# Patient Record
Sex: FEMALE | Race: WHITE | Employment: OTHER | URBAN - METROPOLITAN AREA
[De-identification: names, ages, dates, MRNs, and addresses within clinical notes are randomized per-mention and may not be internally consistent; named-entity substitution may affect disease eponyms.]

---

## 2017-01-01 ENCOUNTER — OFFICE VISIT (OUTPATIENT)
Dept: PHYSICAL THERAPY | Facility: HOSPITAL | Age: 47
End: 2017-01-01
Attending: INTERNAL MEDICINE
Payer: MEDICARE

## 2017-01-01 ENCOUNTER — HOSPITAL ENCOUNTER (OUTPATIENT)
Dept: GENERAL RADIOLOGY | Age: 47
Discharge: HOME | End: 2017-01-01
Attending: INTERNAL MEDICINE
Payer: MEDICARE

## 2017-01-01 ENCOUNTER — APPOINTMENT (OUTPATIENT)
Dept: GENERAL RADIOLOGY | Facility: HOSPITAL | Age: 47
DRG: 390 | End: 2017-01-01
Attending: INTERNAL MEDICINE
Payer: MEDICARE

## 2017-01-01 ENCOUNTER — HOSPITAL ENCOUNTER (INPATIENT)
Facility: HOSPITAL | Age: 47
LOS: 5 days | Discharge: HOME | DRG: 390 | End: 2017-01-01
Attending: EMERGENCY MEDICINE | Admitting: INTERNAL MEDICINE
Payer: MEDICARE

## 2017-01-01 ENCOUNTER — TELEPHONE (OUTPATIENT)
Dept: NEUROLOGY | Facility: CLINIC | Age: 47
End: 2017-01-01

## 2017-01-01 ENCOUNTER — OFFICE VISIT (OUTPATIENT)
Dept: PHYSICAL THERAPY | Facility: HOSPITAL | Age: 47
End: 2017-01-01
Attending: AUDIOLOGIST-HEARING AID FITTER
Payer: MEDICARE

## 2017-01-01 ENCOUNTER — APPOINTMENT (OUTPATIENT)
Dept: MRI IMAGING | Facility: HOSPITAL | Age: 47
DRG: 388 | End: 2017-01-01
Attending: Other
Payer: MEDICARE

## 2017-01-01 ENCOUNTER — TELEPHONE (OUTPATIENT)
Dept: PHYSICAL THERAPY | Facility: HOSPITAL | Age: 47
End: 2017-01-01

## 2017-01-01 ENCOUNTER — OFFICE VISIT (OUTPATIENT)
Dept: NEUROLOGY | Facility: CLINIC | Age: 47
End: 2017-01-01

## 2017-01-01 ENCOUNTER — APPOINTMENT (OUTPATIENT)
Dept: GENERAL RADIOLOGY | Facility: HOSPITAL | Age: 47
DRG: 390 | End: 2017-01-01
Attending: EMERGENCY MEDICINE
Payer: MEDICARE

## 2017-01-01 ENCOUNTER — MOBILE ENCOUNTER (OUTPATIENT)
Dept: INTERNAL MEDICINE CLINIC | Facility: CLINIC | Age: 47
End: 2017-01-01

## 2017-01-01 ENCOUNTER — SNF VISIT (OUTPATIENT)
Dept: INTERNAL MEDICINE CLINIC | Facility: SKILLED NURSING FACILITY | Age: 47
End: 2017-01-01

## 2017-01-01 ENCOUNTER — APPOINTMENT (OUTPATIENT)
Dept: CT IMAGING | Facility: HOSPITAL | Age: 47
DRG: 390 | End: 2017-01-01
Attending: EMERGENCY MEDICINE
Payer: MEDICARE

## 2017-01-01 ENCOUNTER — APPOINTMENT (OUTPATIENT)
Dept: CT IMAGING | Facility: HOSPITAL | Age: 47
DRG: 388 | End: 2017-01-01
Attending: INTERNAL MEDICINE
Payer: MEDICARE

## 2017-01-01 ENCOUNTER — APPOINTMENT (OUTPATIENT)
Dept: CT IMAGING | Facility: HOSPITAL | Age: 47
DRG: 388 | End: 2017-01-01
Attending: EMERGENCY MEDICINE
Payer: MEDICARE

## 2017-01-01 ENCOUNTER — HOSPITAL ENCOUNTER (OUTPATIENT)
Dept: GENERAL RADIOLOGY | Facility: HOSPITAL | Age: 47
Discharge: HOME | End: 2017-01-01
Attending: Other
Payer: MEDICARE

## 2017-01-01 ENCOUNTER — APPOINTMENT (OUTPATIENT)
Dept: GENERAL RADIOLOGY | Facility: HOSPITAL | Age: 47
DRG: 388 | End: 2017-01-01
Attending: EMERGENCY MEDICINE
Payer: MEDICARE

## 2017-01-01 ENCOUNTER — HOSPITAL ENCOUNTER (OUTPATIENT)
Dept: MRI IMAGING | Age: 47
Discharge: HOME | End: 2017-01-01
Attending: Other
Payer: MEDICARE

## 2017-01-01 ENCOUNTER — APPOINTMENT (OUTPATIENT)
Dept: CV DIAGNOSTICS | Facility: HOSPITAL | Age: 47
DRG: 388 | End: 2017-01-01
Attending: INTERNAL MEDICINE
Payer: MEDICARE

## 2017-01-01 ENCOUNTER — APPOINTMENT (OUTPATIENT)
Dept: LAB | Facility: HOSPITAL | Age: 47
End: 2017-01-01
Attending: Other
Payer: MEDICARE

## 2017-01-01 ENCOUNTER — HOSPITAL ENCOUNTER (INPATIENT)
Facility: HOSPITAL | Age: 47
LOS: 5 days | Discharge: HOME HEALTH | DRG: 388 | End: 2017-01-01
Attending: EMERGENCY MEDICINE | Admitting: INTERNAL MEDICINE
Payer: MEDICARE

## 2017-01-01 ENCOUNTER — HOSPITAL ENCOUNTER (OUTPATIENT)
Dept: BONE DENSITY | Age: 47
Discharge: HOME | End: 2017-01-01
Attending: INTERNAL MEDICINE
Payer: MEDICARE

## 2017-01-01 VITALS
RESPIRATION RATE: 18 BRPM | HEART RATE: 100 BPM | WEIGHT: 100.19 LBS | BODY MASS INDEX: 17 KG/M2 | SYSTOLIC BLOOD PRESSURE: 115 MMHG | DIASTOLIC BLOOD PRESSURE: 77 MMHG | TEMPERATURE: 98 F

## 2017-01-01 VITALS
TEMPERATURE: 98 F | BODY MASS INDEX: 17 KG/M2 | SYSTOLIC BLOOD PRESSURE: 124 MMHG | OXYGEN SATURATION: 98 % | DIASTOLIC BLOOD PRESSURE: 95 MMHG | WEIGHT: 100.38 LBS | RESPIRATION RATE: 18 BRPM | HEART RATE: 105 BPM

## 2017-01-01 VITALS
BODY MASS INDEX: 17 KG/M2 | WEIGHT: 100.19 LBS | TEMPERATURE: 97 F | SYSTOLIC BLOOD PRESSURE: 110 MMHG | RESPIRATION RATE: 20 BRPM | DIASTOLIC BLOOD PRESSURE: 74 MMHG | HEART RATE: 84 BPM

## 2017-01-01 VITALS
OXYGEN SATURATION: 94 % | HEIGHT: 64 IN | WEIGHT: 108 LBS | RESPIRATION RATE: 18 BRPM | DIASTOLIC BLOOD PRESSURE: 89 MMHG | BODY MASS INDEX: 18.44 KG/M2 | HEART RATE: 114 BPM | SYSTOLIC BLOOD PRESSURE: 135 MMHG | TEMPERATURE: 98 F

## 2017-01-01 VITALS
SYSTOLIC BLOOD PRESSURE: 118 MMHG | OXYGEN SATURATION: 99 % | BODY MASS INDEX: 17 KG/M2 | TEMPERATURE: 98 F | DIASTOLIC BLOOD PRESSURE: 83 MMHG | HEART RATE: 118 BPM | WEIGHT: 100.38 LBS | RESPIRATION RATE: 18 BRPM

## 2017-01-01 VITALS
HEART RATE: 100 BPM | WEIGHT: 100 LBS | SYSTOLIC BLOOD PRESSURE: 90 MMHG | HEIGHT: 64 IN | OXYGEN SATURATION: 92 % | DIASTOLIC BLOOD PRESSURE: 60 MMHG | BODY MASS INDEX: 17.07 KG/M2

## 2017-01-01 VITALS
BODY MASS INDEX: 17 KG/M2 | SYSTOLIC BLOOD PRESSURE: 121 MMHG | HEART RATE: 87 BPM | WEIGHT: 100.19 LBS | TEMPERATURE: 97 F | DIASTOLIC BLOOD PRESSURE: 74 MMHG | RESPIRATION RATE: 20 BRPM

## 2017-01-01 VITALS
WEIGHT: 100.38 LBS | RESPIRATION RATE: 18 BRPM | HEART RATE: 109 BPM | OXYGEN SATURATION: 98 % | SYSTOLIC BLOOD PRESSURE: 130 MMHG | BODY MASS INDEX: 17 KG/M2 | DIASTOLIC BLOOD PRESSURE: 91 MMHG | TEMPERATURE: 97 F

## 2017-01-01 VITALS
HEIGHT: 64 IN | OXYGEN SATURATION: 96 % | HEART RATE: 109 BPM | WEIGHT: 107.5 LBS | RESPIRATION RATE: 20 BRPM | BODY MASS INDEX: 18.35 KG/M2 | SYSTOLIC BLOOD PRESSURE: 116 MMHG | DIASTOLIC BLOOD PRESSURE: 79 MMHG | TEMPERATURE: 98 F

## 2017-01-01 VITALS
RESPIRATION RATE: 16 BRPM | SYSTOLIC BLOOD PRESSURE: 103 MMHG | DIASTOLIC BLOOD PRESSURE: 77 MMHG | HEART RATE: 111 BPM | OXYGEN SATURATION: 100 %

## 2017-01-01 DIAGNOSIS — R11.2 INTRACTABLE VOMITING WITH NAUSEA, UNSPECIFIED VOMITING TYPE: ICD-10-CM

## 2017-01-01 DIAGNOSIS — R27.0 ATAXIA: ICD-10-CM

## 2017-01-01 DIAGNOSIS — R10.84 GENERALIZED ABDOMINAL PAIN: ICD-10-CM

## 2017-01-01 DIAGNOSIS — R29.898 LEG WEAKNESS, BILATERAL: Primary | ICD-10-CM

## 2017-01-01 DIAGNOSIS — G43.709 CHRONIC MIGRAINE WITHOUT AURA WITHOUT STATUS MIGRAINOSUS, NOT INTRACTABLE: ICD-10-CM

## 2017-01-01 DIAGNOSIS — F41.9 ANXIETY: ICD-10-CM

## 2017-01-01 DIAGNOSIS — E87.6 HYPOKALEMIA: ICD-10-CM

## 2017-01-01 DIAGNOSIS — R53.1 GENERALIZED WEAKNESS: ICD-10-CM

## 2017-01-01 DIAGNOSIS — M25.552 HIP PAIN, ACUTE, LEFT: ICD-10-CM

## 2017-01-01 DIAGNOSIS — K56.7 ILEUS (HCC): Primary | ICD-10-CM

## 2017-01-01 DIAGNOSIS — R26.81 UNSTEADY GAIT: ICD-10-CM

## 2017-01-01 DIAGNOSIS — M54.50 ACUTE LEFT-SIDED LOW BACK PAIN WITHOUT SCIATICA: Primary | ICD-10-CM

## 2017-01-01 DIAGNOSIS — E53.8 VITAMIN B12 DEFICIENCY: ICD-10-CM

## 2017-01-01 DIAGNOSIS — M25.569 KNEE PAIN, UNSPECIFIED CHRONICITY, UNSPECIFIED LATERALITY: ICD-10-CM

## 2017-01-01 DIAGNOSIS — R10.13 ABDOMINAL PAIN, EPIGASTRIC: Primary | ICD-10-CM

## 2017-01-01 DIAGNOSIS — M54.16 RADICULOPATHY, LUMBAR REGION: ICD-10-CM

## 2017-01-01 DIAGNOSIS — M19.90 ARTHRITIS: ICD-10-CM

## 2017-01-01 DIAGNOSIS — G89.4 CHRONIC PAIN SYNDROME: ICD-10-CM

## 2017-01-01 DIAGNOSIS — S22.41XA CLOSED FRACTURE OF MULTIPLE RIBS OF RIGHT SIDE, INITIAL ENCOUNTER: ICD-10-CM

## 2017-01-01 DIAGNOSIS — R29.6 FREQUENT FALLS: ICD-10-CM

## 2017-01-01 DIAGNOSIS — M54.50 ACUTE LEFT-SIDED LOW BACK PAIN WITHOUT SCIATICA: ICD-10-CM

## 2017-01-01 DIAGNOSIS — G95.9 MYELOPATHY (HCC): ICD-10-CM

## 2017-01-01 DIAGNOSIS — M19.90 OSTEOARTHRITIS, UNSPECIFIED OSTEOARTHRITIS TYPE, UNSPECIFIED SITE: ICD-10-CM

## 2017-01-01 DIAGNOSIS — N95.1 MENOPAUSAL SYNDROME: ICD-10-CM

## 2017-01-01 DIAGNOSIS — G89.4 CHRONIC PAIN SYNDROME: Primary | ICD-10-CM

## 2017-01-01 DIAGNOSIS — S60.052A CONTUSION OF LEFT LITTLE FINGER WITHOUT DAMAGE TO NAIL, INITIAL ENCOUNTER: ICD-10-CM

## 2017-01-01 DIAGNOSIS — G95.9 MYELOPATHY (HCC): Primary | ICD-10-CM

## 2017-01-01 DIAGNOSIS — R27.0 ATAXIA: Primary | ICD-10-CM

## 2017-01-01 DIAGNOSIS — M25.552 PAIN OF LEFT HIP JOINT: ICD-10-CM

## 2017-01-01 LAB
ALBUMIN SERPL BCP-MCNC: 2.7 G/DL (ref 3.5–4.8)
ALBUMIN SERPL BCP-MCNC: 3.6 G/DL (ref 3.5–4.8)
ALBUMIN SERPL ELPH-MCNC: 3 G/DL (ref 3.8–5.8)
ALBUMIN/GLOB SERPL: 1 {RATIO} (ref 1–2)
ALBUMIN/GLOB SERPL: 1.3 {RATIO} (ref 1–2)
ALP SERPL-CCNC: 79 U/L (ref 32–100)
ALP SERPL-CCNC: 96 U/L (ref 32–100)
ALPHA1 GLOB SERPL ELPH-MCNC: 0.24 G/DL (ref 0.1–0.3)
ALPHA2 GLOB SERPL ELPH-MCNC: 0.64 G/DL (ref 0.6–1)
ALT SERPL-CCNC: 16 U/L (ref 14–54)
ALT SERPL-CCNC: 20 U/L (ref 14–54)
ANION GAP SERPL CALC-SCNC: 10 MMOL/L (ref 0–18)
ANION GAP SERPL CALC-SCNC: 13 MMOL/L (ref 0–18)
ANTIBODY SCREEN: POSITIVE
AST SERPL-CCNC: 21 U/L (ref 15–41)
AST SERPL-CCNC: 30 U/L (ref 15–41)
B-GLOBULIN SERPL ELPH-MCNC: 0.7 G/DL (ref 0.7–1.3)
BASOPHILS # BLD: 0.1 K/UL (ref 0–0.2)
BASOPHILS NFR BLD: 1 %
BILIRUB DIRECT SERPL-MCNC: 0.3 MG/DL (ref 0–0.2)
BILIRUB SERPL-MCNC: 0.6 MG/DL (ref 0.3–1.2)
BILIRUB SERPL-MCNC: 0.6 MG/DL (ref 0.3–1.2)
BILIRUB UR QL: NEGATIVE
BUN SERPL-MCNC: 20 MG/DL (ref 8–20)
BUN SERPL-MCNC: 6 MG/DL (ref 8–20)
BUN/CREAT SERPL: 26 (ref 10–20)
BUN/CREAT SERPL: 8.2 (ref 10–20)
CALCIUM SERPL-MCNC: 8.4 MG/DL (ref 8.5–10.5)
CALCIUM SERPL-MCNC: 9.5 MG/DL (ref 8.5–10.5)
CHLORIDE SERPL-SCNC: 102 MMOL/L (ref 95–110)
CHLORIDE SERPL-SCNC: 104 MMOL/L (ref 95–110)
CLARITY UR: CLEAR
CO2 SERPL-SCNC: 23 MMOL/L (ref 22–32)
CO2 SERPL-SCNC: 24 MMOL/L (ref 22–32)
COLOR UR: YELLOW
CREAT SERPL-MCNC: 0.73 MG/DL (ref 0.5–1.5)
CREAT SERPL-MCNC: 0.77 MG/DL (ref 0.5–1.5)
DIRECT COOMBS POLY: NEGATIVE
EOSINOPHIL # BLD: 0 K/UL (ref 0–0.7)
EOSINOPHIL NFR BLD: 0 %
ERYTHROCYTE [DISTWIDTH] IN BLOOD BY AUTOMATED COUNT: 14.1 % (ref 11–15)
ERYTHROCYTE [DISTWIDTH] IN BLOOD BY AUTOMATED COUNT: 14.2 % (ref 11–15)
FERRITIN SERPL IA-MCNC: 104 NG/ML (ref 11–307)
GAMMA GLOB SERPL ELPH-MCNC: 0.73 G/DL (ref 0.5–1.7)
GLOBULIN PLAS-MCNC: 2.8 G/DL (ref 2.5–3.7)
GLUCOSE SERPL-MCNC: 117 MG/DL (ref 70–99)
GLUCOSE SERPL-MCNC: 150 MG/DL (ref 70–99)
GLUCOSE UR-MCNC: NEGATIVE MG/DL
HCT VFR BLD AUTO: 36.4 % (ref 35–48)
HCT VFR BLD AUTO: 41 % (ref 35–48)
HGB BLD-MCNC: 11.8 G/DL (ref 12–16)
HGB BLD-MCNC: 13.5 G/DL (ref 12–16)
HGB UR QL STRIP.AUTO: NEGATIVE
KETONES UR-MCNC: NEGATIVE MG/DL
LEUKOCYTE ESTERASE UR QL STRIP.AUTO: NEGATIVE
LIPASE SERPL-CCNC: 18 U/L (ref 22–51)
LYMPHOCYTES # BLD: 1.3 K/UL (ref 1–4)
LYMPHOCYTES NFR BLD: 12 %
MCH RBC QN AUTO: 31.7 PG (ref 27–32)
MCH RBC QN AUTO: 32.1 PG (ref 27–32)
MCHC RBC AUTO-ENTMCNC: 32.4 G/DL (ref 32–37)
MCHC RBC AUTO-ENTMCNC: 32.9 G/DL (ref 32–37)
MCV RBC AUTO: 97.6 FL (ref 80–100)
MCV RBC AUTO: 97.6 FL (ref 80–100)
MONOCYTES # BLD: 0.2 K/UL (ref 0–1)
MONOCYTES NFR BLD: 2 %
NEUTROPHILS # BLD AUTO: 9.1 K/UL (ref 1.8–7.7)
NEUTROPHILS NFR BLD: 85 %
NITRITE UR QL STRIP.AUTO: NEGATIVE
OSMOLALITY UR CALC.SUM OF ELEC: 285 MOSM/KG (ref 275–295)
OSMOLALITY UR CALC.SUM OF ELEC: 291 MOSM/KG (ref 275–295)
PH UR: 5 [PH] (ref 5–8)
PLATELET # BLD AUTO: 452 K/UL (ref 140–400)
PLATELET # BLD AUTO: 550 K/UL (ref 140–400)
PMV BLD AUTO: 7.7 FL (ref 7.4–10.3)
PMV BLD AUTO: 8.1 FL (ref 7.4–10.3)
POTASSIUM SERPL-SCNC: 3.4 MMOL/L (ref 3.3–5.1)
POTASSIUM SERPL-SCNC: 3.5 MMOL/L (ref 3.3–5.1)
POTASSIUM SERPL-SCNC: 3.5 MMOL/L (ref 3.3–5.1)
POTASSIUM SERPL-SCNC: 4 MMOL/L (ref 3.3–5.1)
PROT SERPL-MCNC: 5.5 G/DL (ref 5.9–8.4)
PROT SERPL-MCNC: 7 G/DL (ref 5.9–8.4)
PROT UR-MCNC: NEGATIVE MG/DL
RBC # BLD AUTO: 3.73 M/UL (ref 3.7–5.4)
RBC # BLD AUTO: 4.2 M/UL (ref 3.7–5.4)
RBC #/AREA URNS AUTO: 1 /HPF
RH BLOOD TYPE: POSITIVE
SODIUM SERPL-SCNC: 138 MMOL/L (ref 136–144)
SODIUM SERPL-SCNC: 138 MMOL/L (ref 136–144)
TOTAL PROTEIN (SPECIAL TESTING): 5.3 G/DL (ref 6.5–9.1)
UROBILINOGEN UR STRIP-ACNC: 2
VIT C UR-MCNC: 40 MG/DL
WBC # BLD AUTO: 10.7 K/UL (ref 4–11)
WBC # BLD AUTO: 13.2 K/UL (ref 4–11)
WBC #/AREA URNS AUTO: 1 /HPF

## 2017-01-01 PROCEDURE — 97162 PT EVAL MOD COMPLEX 30 MIN: CPT

## 2017-01-01 PROCEDURE — 71010 XR CHEST AP PORTABLE  (CPT=71010): CPT

## 2017-01-01 PROCEDURE — 97112 NEUROMUSCULAR REEDUCATION: CPT

## 2017-01-01 PROCEDURE — 99232 SBSQ HOSP IP/OBS MODERATE 35: CPT | Performed by: INTERNAL MEDICINE

## 2017-01-01 PROCEDURE — 93306 TTE W/DOPPLER COMPLETE: CPT | Performed by: INTERNAL MEDICINE

## 2017-01-01 PROCEDURE — 0DH67UZ INSERTION OF FEEDING DEVICE INTO STOMACH, VIA NATURAL OR ARTIFICIAL OPENING: ICD-10-PCS | Performed by: EMERGENCY MEDICINE

## 2017-01-01 PROCEDURE — 99223 1ST HOSP IP/OBS HIGH 75: CPT | Performed by: OTHER

## 2017-01-01 PROCEDURE — 99308 SBSQ NF CARE LOW MDM 20: CPT | Performed by: NURSE PRACTITIONER

## 2017-01-01 PROCEDURE — 87070 CULTURE OTHR SPECIMN AEROBIC: CPT

## 2017-01-01 PROCEDURE — 95816 EEG AWAKE AND DROWSY: CPT | Performed by: OTHER

## 2017-01-01 PROCEDURE — 71101 X-RAY EXAM UNILAT RIBS/CHEST: CPT | Performed by: EMERGENCY MEDICINE

## 2017-01-01 PROCEDURE — 4A10X4Z MONITORING OF CENTRAL NERVOUS ELECTRICAL ACTIVITY, EXTERNAL APPROACH: ICD-10-PCS | Performed by: OTHER

## 2017-01-01 PROCEDURE — 84165 PROTEIN E-PHORESIS SERUM: CPT

## 2017-01-01 PROCEDURE — A9575 INJ GADOTERATE MEGLUMI 0.1ML: HCPCS | Performed by: OTHER

## 2017-01-01 PROCEDURE — 70551 MRI BRAIN STEM W/O DYE: CPT | Performed by: OTHER

## 2017-01-01 PROCEDURE — 87205 SMEAR GRAM STAIN: CPT

## 2017-01-01 PROCEDURE — 82040 ASSAY OF SERUM ALBUMIN: CPT

## 2017-01-01 PROCEDURE — 82784 ASSAY IGA/IGD/IGG/IGM EACH: CPT

## 2017-01-01 PROCEDURE — 36415 COLL VENOUS BLD VENIPUNCTURE: CPT

## 2017-01-01 PROCEDURE — 84157 ASSAY OF PROTEIN OTHER: CPT

## 2017-01-01 PROCEDURE — 89051 BODY FLUID CELL COUNT: CPT

## 2017-01-01 PROCEDURE — 99307 SBSQ NF CARE SF MDM 10: CPT | Performed by: NURSE PRACTITIONER

## 2017-01-01 PROCEDURE — 74020 XR ABDOMEN, OBSTRUCTIVE SERIES (CPT=74020): CPT | Performed by: RADIOLOGY

## 2017-01-01 PROCEDURE — 97530 THERAPEUTIC ACTIVITIES: CPT

## 2017-01-01 PROCEDURE — 99232 SBSQ HOSP IP/OBS MODERATE 35: CPT | Performed by: OTHER

## 2017-01-01 PROCEDURE — 62270 DX LMBR SPI PNXR: CPT

## 2017-01-01 PROCEDURE — 77003 FLUOROGUIDE FOR SPINE INJECT: CPT

## 2017-01-01 PROCEDURE — 73502 X-RAY EXAM HIP UNI 2-3 VIEWS: CPT

## 2017-01-01 PROCEDURE — 99310 SBSQ NF CARE HIGH MDM 45: CPT | Performed by: NURSE PRACTITIONER

## 2017-01-01 PROCEDURE — 71260 CT THORAX DX C+: CPT | Performed by: INTERNAL MEDICINE

## 2017-01-01 PROCEDURE — 73140 X-RAY EXAM OF FINGER(S): CPT

## 2017-01-01 PROCEDURE — 89050 BODY FLUID CELL COUNT: CPT

## 2017-01-01 PROCEDURE — 83916 OLIGOCLONAL BANDS: CPT

## 2017-01-01 PROCEDURE — 99214 OFFICE O/P EST MOD 30 MIN: CPT | Performed by: OTHER

## 2017-01-01 PROCEDURE — 74020 XR ABDOMEN, OBSTRUCTIVE SERIES (CPT=74020): CPT | Performed by: EMERGENCY MEDICINE

## 2017-01-01 PROCEDURE — 82945 GLUCOSE OTHER FLUID: CPT

## 2017-01-01 PROCEDURE — 82042 OTHER SOURCE ALBUMIN QUAN EA: CPT

## 2017-01-01 PROCEDURE — 72156 MRI NECK SPINE W/O & W/DYE: CPT

## 2017-01-01 PROCEDURE — 86592 SYPHILIS TEST NON-TREP QUAL: CPT

## 2017-01-01 PROCEDURE — 72110 X-RAY EXAM L-2 SPINE 4/>VWS: CPT

## 2017-01-01 PROCEDURE — 97163 PT EVAL HIGH COMPLEX 45 MIN: CPT

## 2017-01-01 PROCEDURE — 74177 CT ABD & PELVIS W/CONTRAST: CPT | Performed by: EMERGENCY MEDICINE

## 2017-01-01 PROCEDURE — 74177 CT ABD & PELVIS W/CONTRAST: CPT

## 2017-01-01 RX ORDER — 0.9 % SODIUM CHLORIDE 0.9 %
5 VIAL (ML) INJECTION AS NEEDED
Status: DISCONTINUED | OUTPATIENT
Start: 2017-01-01 | End: 2017-01-01

## 2017-01-01 RX ORDER — SUMATRIPTAN 50 MG/1
100 TABLET, FILM COATED ORAL ONCE
Status: COMPLETED | OUTPATIENT
Start: 2017-01-01 | End: 2017-01-01

## 2017-01-01 RX ORDER — TOPIRAMATE 25 MG/1
25 TABLET ORAL 2 TIMES DAILY
Status: DISCONTINUED | OUTPATIENT
Start: 2017-01-01 | End: 2017-01-01

## 2017-01-01 RX ORDER — HYDROMORPHONE HYDROCHLORIDE 1 MG/ML
0.5 INJECTION, SOLUTION INTRAMUSCULAR; INTRAVENOUS; SUBCUTANEOUS ONCE
Status: COMPLETED | OUTPATIENT
Start: 2017-01-01 | End: 2017-01-01

## 2017-01-01 RX ORDER — SODIUM CHLORIDE 0.9 % (FLUSH) 0.9 %
SYRINGE (ML) INJECTION
Status: DISPENSED
Start: 2017-01-01 | End: 2017-01-01

## 2017-01-01 RX ORDER — LORAZEPAM 2 MG/ML
0.5 INJECTION INTRAMUSCULAR ONCE
Status: DISCONTINUED | OUTPATIENT
Start: 2017-01-01 | End: 2017-01-01

## 2017-01-01 RX ORDER — SODIUM CHLORIDE 0.9 % (FLUSH) 0.9 %
SYRINGE (ML) INJECTION
Status: COMPLETED
Start: 2017-01-01 | End: 2017-01-01

## 2017-01-01 RX ORDER — PROCHLORPERAZINE MALEATE 10 MG
10 TABLET ORAL EVERY 6 HOURS PRN
Status: DISCONTINUED | OUTPATIENT
Start: 2017-01-01 | End: 2017-01-01

## 2017-01-01 RX ORDER — HYDROMORPHONE HYDROCHLORIDE 1 MG/ML
1.5 INJECTION, SOLUTION INTRAMUSCULAR; INTRAVENOUS; SUBCUTANEOUS EVERY 4 HOURS PRN
Status: DISCONTINUED | OUTPATIENT
Start: 2017-01-01 | End: 2017-01-01

## 2017-01-01 RX ORDER — HYDROXYZINE PAMOATE 50 MG/1
CAPSULE ORAL
Refills: 1 | COMMUNITY
Start: 2017-01-01

## 2017-01-01 RX ORDER — ACETAMINOPHEN AND CODEINE PHOSPHATE 120; 12 MG/5ML; MG/5ML
60 SOLUTION ORAL NIGHTLY PRN
Status: DISCONTINUED | OUTPATIENT
Start: 2017-01-01 | End: 2017-01-01

## 2017-01-01 RX ORDER — BISACODYL 10 MG
10 SUPPOSITORY, RECTAL RECTAL DAILY
Status: DISCONTINUED | OUTPATIENT
Start: 2017-01-01 | End: 2017-01-01

## 2017-01-01 RX ORDER — BISACODYL 10 MG
10 SUPPOSITORY, RECTAL RECTAL
Status: DISCONTINUED | OUTPATIENT
Start: 2017-01-01 | End: 2017-01-01

## 2017-01-01 RX ORDER — 0.9 % SODIUM CHLORIDE 0.9 %
VIAL (ML) INJECTION
Status: COMPLETED
Start: 2017-01-01 | End: 2017-01-01

## 2017-01-01 RX ORDER — DEXTROSE, SODIUM CHLORIDE, AND POTASSIUM CHLORIDE 5; .45; .075 G/100ML; G/100ML; G/100ML
INJECTION INTRAVENOUS CONTINUOUS
Status: DISCONTINUED | OUTPATIENT
Start: 2017-01-01 | End: 2017-01-01

## 2017-01-01 RX ORDER — LORAZEPAM 2 MG/ML
1 INJECTION INTRAMUSCULAR EVERY 6 HOURS PRN
Status: DISCONTINUED | OUTPATIENT
Start: 2017-01-01 | End: 2017-01-01

## 2017-01-01 RX ORDER — ONDANSETRON 2 MG/ML
8 INJECTION INTRAMUSCULAR; INTRAVENOUS EVERY 6 HOURS PRN
Status: DISCONTINUED | OUTPATIENT
Start: 2017-01-01 | End: 2017-01-01

## 2017-01-01 RX ORDER — MORPHINE SULFATE 4 MG/ML
4 INJECTION, SOLUTION INTRAMUSCULAR; INTRAVENOUS ONCE
Status: COMPLETED | OUTPATIENT
Start: 2017-01-01 | End: 2017-01-01

## 2017-01-01 RX ORDER — 0.9 % SODIUM CHLORIDE 0.9 %
VIAL (ML) INJECTION
Status: DISPENSED
Start: 2017-01-01 | End: 2017-01-01

## 2017-01-01 RX ORDER — METOPROLOL SUCCINATE 50 MG/1
50 TABLET, EXTENDED RELEASE ORAL
Status: DISCONTINUED | OUTPATIENT
Start: 2017-01-01 | End: 2017-01-01

## 2017-01-01 RX ORDER — METOCLOPRAMIDE 10 MG/1
10 TABLET ORAL
Status: DISCONTINUED | OUTPATIENT
Start: 2017-01-01 | End: 2017-01-01

## 2017-01-01 RX ORDER — DIPHENHYDRAMINE HYDROCHLORIDE 50 MG/ML
25 INJECTION INTRAMUSCULAR; INTRAVENOUS ONCE
Status: COMPLETED | OUTPATIENT
Start: 2017-01-01 | End: 2017-01-01

## 2017-01-01 RX ORDER — BUSPIRONE HYDROCHLORIDE 15 MG/1
15 TABLET ORAL 3 TIMES DAILY
Status: DISCONTINUED | OUTPATIENT
Start: 2017-01-01 | End: 2017-01-01

## 2017-01-01 RX ORDER — ONDANSETRON 4 MG/1
8 TABLET, FILM COATED ORAL EVERY 8 HOURS PRN
Status: DISCONTINUED | OUTPATIENT
Start: 2017-01-01 | End: 2017-01-01

## 2017-01-01 RX ORDER — ONDANSETRON 2 MG/ML
INJECTION INTRAMUSCULAR; INTRAVENOUS
Status: DISPENSED
Start: 2017-01-01 | End: 2017-01-01

## 2017-01-01 RX ORDER — DIAZEPAM 10 MG/1
10 TABLET ORAL 4 TIMES DAILY
Status: DISCONTINUED | OUTPATIENT
Start: 2017-01-01 | End: 2017-01-01

## 2017-01-01 RX ORDER — CYANOCOBALAMIN 1000 UG/ML
1000 INJECTION INTRAMUSCULAR; SUBCUTANEOUS ONCE
Status: COMPLETED | OUTPATIENT
Start: 2017-01-01 | End: 2017-01-01

## 2017-01-01 RX ORDER — LORAZEPAM 2 MG/1
2 TABLET ORAL 2 TIMES DAILY PRN
Status: DISCONTINUED | OUTPATIENT
Start: 2017-01-01 | End: 2017-01-01

## 2017-01-01 RX ORDER — HYDROCODONE BITARTRATE AND ACETAMINOPHEN 10; 325 MG/1; MG/1
1 TABLET ORAL
Status: DISCONTINUED | OUTPATIENT
Start: 2017-01-01 | End: 2017-01-01

## 2017-01-01 RX ORDER — PANTOPRAZOLE SODIUM 40 MG/1
40 TABLET, DELAYED RELEASE ORAL
Status: DISCONTINUED | OUTPATIENT
Start: 2017-01-01 | End: 2017-01-01

## 2017-01-01 RX ORDER — ALBUTEROL SULFATE 90 UG/1
2 AEROSOL, METERED RESPIRATORY (INHALATION) EVERY 6 HOURS PRN
Status: DISCONTINUED | OUTPATIENT
Start: 2017-01-01 | End: 2017-01-01

## 2017-01-01 RX ORDER — MORPHINE SULFATE 30 MG/1
30 TABLET, FILM COATED, EXTENDED RELEASE ORAL EVERY 8 HOURS SCHEDULED
Status: DISCONTINUED | OUTPATIENT
Start: 2017-01-01 | End: 2017-01-01

## 2017-01-01 RX ORDER — LIDOCAINE 50 MG/G
2 PATCH TOPICAL EVERY 24 HOURS
Qty: 60 PATCH | Refills: 1 | Status: SHIPPED | OUTPATIENT
Start: 2017-01-01

## 2017-01-01 RX ORDER — ONDANSETRON 2 MG/ML
4 INJECTION INTRAMUSCULAR; INTRAVENOUS ONCE
Status: COMPLETED | OUTPATIENT
Start: 2017-01-01 | End: 2017-01-01

## 2017-01-01 RX ORDER — HYDROMORPHONE HYDROCHLORIDE 1 MG/ML
0.5 INJECTION, SOLUTION INTRAMUSCULAR; INTRAVENOUS; SUBCUTANEOUS EVERY 2 HOUR PRN
Status: DISCONTINUED | OUTPATIENT
Start: 2017-01-01 | End: 2017-01-01

## 2017-01-01 RX ORDER — TRAZODONE HYDROCHLORIDE 100 MG/1
200 TABLET ORAL NIGHTLY
Status: DISCONTINUED | OUTPATIENT
Start: 2017-01-01 | End: 2017-01-01

## 2017-01-01 RX ORDER — DIAZEPAM 10 MG/1
10 TABLET ORAL 4 TIMES DAILY PRN
Status: DISCONTINUED | OUTPATIENT
Start: 2017-01-01 | End: 2017-01-01

## 2017-01-01 RX ORDER — LIDOCAINE 50 MG/G
1 PATCH TOPICAL EVERY 24 HOURS
Status: DISCONTINUED | OUTPATIENT
Start: 2017-01-01 | End: 2017-01-01

## 2017-01-01 RX ORDER — GABAPENTIN 100 MG/1
300 CAPSULE ORAL 4 TIMES DAILY
COMMUNITY
Start: 2017-01-01

## 2017-01-01 RX ORDER — MORPHINE SULFATE 30 MG/1
30 TABLET, FILM COATED, EXTENDED RELEASE ORAL EVERY 12 HOURS SCHEDULED
Status: DISCONTINUED | OUTPATIENT
Start: 2017-01-01 | End: 2017-01-01

## 2017-01-01 RX ORDER — ONDANSETRON 2 MG/ML
8 INJECTION INTRAMUSCULAR; INTRAVENOUS EVERY 4 HOURS PRN
Status: DISCONTINUED | OUTPATIENT
Start: 2017-01-01 | End: 2017-01-01

## 2017-01-01 RX ORDER — HYDROMORPHONE HYDROCHLORIDE 1 MG/ML
INJECTION, SOLUTION INTRAMUSCULAR; INTRAVENOUS; SUBCUTANEOUS
Status: COMPLETED
Start: 2017-01-01 | End: 2017-01-01

## 2017-01-01 RX ORDER — DIPHENHYDRAMINE HYDROCHLORIDE 50 MG/ML
50 INJECTION INTRAMUSCULAR; INTRAVENOUS EVERY 6 HOURS PRN
Status: DISCONTINUED | OUTPATIENT
Start: 2017-01-01 | End: 2017-01-01

## 2017-01-01 RX ORDER — SUCRALFATE 1 G/1
1 TABLET ORAL
Status: DISCONTINUED | OUTPATIENT
Start: 2017-01-01 | End: 2017-01-01

## 2017-01-01 RX ORDER — POTASSIUM CHLORIDE 20 MEQ/1
40 TABLET, EXTENDED RELEASE ORAL EVERY 4 HOURS
Status: COMPLETED | OUTPATIENT
Start: 2017-01-01 | End: 2017-01-01

## 2017-01-01 RX ORDER — HYDROXYZINE HYDROCHLORIDE 25 MG/1
50 TABLET, FILM COATED ORAL EVERY 6 HOURS PRN
Status: DISCONTINUED | OUTPATIENT
Start: 2017-01-01 | End: 2017-01-01

## 2017-01-01 RX ORDER — HYDROMORPHONE HYDROCHLORIDE 1 MG/ML
1 INJECTION, SOLUTION INTRAMUSCULAR; INTRAVENOUS; SUBCUTANEOUS ONCE
Status: COMPLETED | OUTPATIENT
Start: 2017-01-01 | End: 2017-01-01

## 2017-01-01 RX ORDER — HYDROCODONE BITARTRATE AND ACETAMINOPHEN 10; 325 MG/1; MG/1
2 TABLET ORAL EVERY 6 HOURS PRN
Status: DISCONTINUED | OUTPATIENT
Start: 2017-01-01 | End: 2017-01-01

## 2017-01-01 RX ORDER — LORAZEPAM 2 MG/ML
1 INJECTION INTRAMUSCULAR ONCE
Status: COMPLETED | OUTPATIENT
Start: 2017-01-01 | End: 2017-01-01

## 2017-01-01 RX ORDER — POTASSIUM CHLORIDE 14.9 MG/ML
20 INJECTION INTRAVENOUS ONCE
Status: COMPLETED | OUTPATIENT
Start: 2017-01-01 | End: 2017-01-01

## 2017-01-01 RX ORDER — METOCLOPRAMIDE HYDROCHLORIDE 5 MG/ML
10 INJECTION INTRAMUSCULAR; INTRAVENOUS EVERY 6 HOURS PRN
Status: DISCONTINUED | OUTPATIENT
Start: 2017-01-01 | End: 2017-01-01

## 2017-01-01 RX ORDER — SUMATRIPTAN 50 MG/1
100 TABLET, FILM COATED ORAL EVERY 2 HOUR PRN
Status: DISCONTINUED | OUTPATIENT
Start: 2017-01-01 | End: 2017-01-01

## 2017-01-01 RX ORDER — TRAZODONE HYDROCHLORIDE 100 MG/1
100 TABLET ORAL 2 TIMES DAILY
Status: DISCONTINUED | OUTPATIENT
Start: 2017-01-01 | End: 2017-01-01

## 2017-01-01 RX ORDER — DULOXETIN HYDROCHLORIDE 30 MG/1
60 CAPSULE, DELAYED RELEASE ORAL DAILY
Status: DISCONTINUED | OUTPATIENT
Start: 2017-01-01 | End: 2017-01-01

## 2017-01-01 RX ORDER — METOCLOPRAMIDE HYDROCHLORIDE 5 MG/ML
10 INJECTION INTRAMUSCULAR; INTRAVENOUS ONCE
Status: COMPLETED | OUTPATIENT
Start: 2017-01-01 | End: 2017-01-01

## 2017-01-01 RX ORDER — SUMATRIPTAN 6 MG/.5ML
6 INJECTION, SOLUTION SUBCUTANEOUS ONCE
Status: COMPLETED | OUTPATIENT
Start: 2017-01-01 | End: 2017-01-01

## 2017-01-01 RX ORDER — TOPIRAMATE 25 MG/1
25 TABLET ORAL 2 TIMES DAILY
Qty: 60 TABLET | Refills: 1 | Status: SHIPPED | OUTPATIENT
Start: 2017-01-01

## 2017-01-01 RX ORDER — OLANZAPINE 2.5 MG/1
2.5 TABLET ORAL NIGHTLY
Status: DISCONTINUED | OUTPATIENT
Start: 2017-01-01 | End: 2017-01-01

## 2017-01-01 RX ORDER — BUSPIRONE HYDROCHLORIDE 5 MG/1
15 TABLET ORAL 3 TIMES DAILY
Status: DISCONTINUED | OUTPATIENT
Start: 2017-01-01 | End: 2017-01-01

## 2017-01-01 RX ORDER — POTASSIUM CHLORIDE 20 MEQ/1
40 TABLET, EXTENDED RELEASE ORAL ONCE
Status: COMPLETED | OUTPATIENT
Start: 2017-01-01 | End: 2017-01-01

## 2017-01-01 RX ORDER — CYCLOBENZAPRINE HCL 10 MG
10 TABLET ORAL 3 TIMES DAILY PRN
Status: DISCONTINUED | OUTPATIENT
Start: 2017-01-01 | End: 2017-01-01

## 2017-01-01 RX ORDER — HYDROMORPHONE HYDROCHLORIDE 1 MG/ML
1 INJECTION, SOLUTION INTRAMUSCULAR; INTRAVENOUS; SUBCUTANEOUS EVERY 2 HOUR PRN
Status: DISCONTINUED | OUTPATIENT
Start: 2017-01-01 | End: 2017-01-01

## 2017-01-01 RX ORDER — SODIUM CHLORIDE 450 MG/100ML
INJECTION, SOLUTION INTRAVENOUS CONTINUOUS
Status: DISCONTINUED | OUTPATIENT
Start: 2017-01-01 | End: 2017-01-01

## 2017-01-01 RX ORDER — ENOXAPARIN SODIUM 100 MG/ML
40 INJECTION SUBCUTANEOUS EVERY 24 HOURS
Status: DISCONTINUED | OUTPATIENT
Start: 2017-01-01 | End: 2017-01-01

## 2017-01-01 RX ORDER — RIZATRIPTAN BENZOATE 10 MG/1
10 TABLET, ORALLY DISINTEGRATING ORAL ONCE
Status: COMPLETED | OUTPATIENT
Start: 2017-01-01 | End: 2017-01-01

## 2017-01-01 RX ORDER — TROLAMINE SALICYLATE 10 G/100G
CREAM TOPICAL 2 TIMES DAILY PRN
Status: DISCONTINUED | OUTPATIENT
Start: 2017-01-01 | End: 2017-01-01

## 2017-01-01 RX ORDER — HYDROXYZINE PAMOATE 50 MG/1
50 CAPSULE ORAL 4 TIMES DAILY PRN
Status: DISCONTINUED | OUTPATIENT
Start: 2017-01-01 | End: 2017-01-01

## 2017-02-02 PROBLEM — R10.13 ABDOMINAL PAIN, EPIGASTRIC: Status: ACTIVE | Noted: 2017-01-01

## 2017-02-02 NOTE — ED INITIAL ASSESSMENT (HPI)
Pt came in for abdominal pain for 4 days. Pt concerned for bowel obstruction. Last BM unknown. RR even and nonlabored, speaking in full sentences.  Denies fevers, cough, N/V.

## 2017-02-02 NOTE — ED NOTES
Pt presents to ED with c/o N/V and mid abd pain. Pt states s/s onset \"few days ago\" but pt thought \"maybe I had a cold\". Pt has a hx of bowel obstruction x3. States current s/s are similar.

## 2017-02-02 NOTE — CONSULTS
[facility]    Report of Consultation    Ricky Cain Patient Status:  Emergency    10/21/1970 MRN [id number]   Location [address] Attending No att. providers found   Hosp Day # 0 PCP Amy Shoemaker MD     D takes Zofran, episodes of small bowel obstruction, and a chronic pain syndrome including migraines, abdominal pain, and arthritic pains in her lower extremities for which she has been seeing a pain specialist who was prescribed Norco and morphine.     She c Family History  Family History   Problem Relation Age of Onset   • Hypertension Father    • Hypertension Mother    • Heart Disorder Mother    • Migraines Mother    • Hypertension Brother    • Heart Disorder Brother    • Migraines Brother        Román Donald tenderness  PSYCH: normal affect  NUT: well nourished appearing, no cachexia      Results:     Laboratory Data:    Lab Results  Component Value Date   WBC 10.7 02/02/2017   HGB 13.5 02/02/2017   HCT 41.0 02/02/2017   * 02/02/2017   CREATSERUM 0.77 0 (CPT=73140)  COMPARISON: None. INDICATIONS: Left 5th digit pain from DIPJ down into hand post fall 1.5 weeks ago. TECHNIQUE: 3 views were obtained.    FINDINGS:  BONES: There a subacute appearing oblique/spiral fracture through the middle phalanx of the r centered at C5-6. Multilevel spondylosis. CORD: Mild ventral cord effacement C5-6 without abnormal cord signal or enhancement. OTHER: No abnormal enhancement.   CERVICAL DISC LEVELS: C3-C4: Broad left posterior lateral disc osteophyte complex with ventral e Sharan Torres MD [facility]    2/2/2017

## 2017-02-02 NOTE — ED PROVIDER NOTES
Patient Seen in: [facility] AND [facility] Emergency Department    History   Patient presents with:  Abdomen/Flank Pain (GI/)    Stated Complaint: body aches x 4 days    HPI    48year old female with a history of peptic ulcer disease, bleeding ulcers, status po tablet (2 mg total) by mouth 2 (two) times daily as needed for Anxiety. morphINE Sulfate ER 30 MG Oral Tab CR,  Take 1 tablet (30 mg total) by mouth every 8 (eight) hours.    Flurazepam HCl 30 MG Oral Cap,  Take 2 capsules (60 mg total) by mouth nightly a MG Oral Tab,  Take  by mouth.        Family History   Problem Relation Age of Onset   • Hypertension Father    • Hypertension Mother    • Heart Disorder Mother    • Migraines Mother    • Hypertension Brother    • Heart Disorder Brother    • Migraines Brothe apnea and no tachypnea. No respiratory distress. She exhibits no retraction. Abdominal: There is tenderness (In the epigastrium). There is no rebound and no guarding. Musculoskeletal: Normal range of motion. She exhibits no edema.    Neurological: She i limits            Imaging Results Available and Reviewed while in ED: XR ABDOMEN, OBSTRUCTIVE SERIES (CPT=74020)    (Results Pending)  CT ABDOMEN+PELVIS(CONTRAST ONLY)(CPT=74177)    (Results Pending)    ED Medications Administered:   Medications   sodium c pain syndrome; Abdominal pain; Knee derangement; Anemia; Asthma; Depression; Radiculopathy, lumbar region; Radiculopathy, cervical; Sciatica; CELESTINO (obstructive sleep apnea);  Fatigue; and Abdominal pain, epigastric on her problem list. to contribute to the c

## 2017-02-03 NOTE — DIETARY NOTE
ADULT NUTRITION INITIAL ASSESSMENT    Pt is at moderate nutrition risk. Pt meets malnutrition criteria.       CRITERIA FOR MALNUTRITION DIAGNOSIS:  Criteria for non-severe malnutrition diagnosis: chronic illness related to body fat mild depletion and relat 02/02/17 1252 46.267 kg (102 lb)       GASTROINTESTINAL PROBLEMS: abdominal pain, nausea and constipation    FOOD/NUTRITION RELATED HISTORY:  Appetite: NPO  Intake:NPO    Intake Meeting Needs: NPO    Food Allergies: No    MEDICATIONS: Protonix; others no

## 2017-02-03 NOTE — PROGRESS NOTES
[facility]  Gastroenterology Progress Note    Gurmeet Salgado Patient Status:  Inpatient    10/21/1970 MRN [id number]   Location [facility] 5SW/SE Attending Zelda [place] MD   Hosp Day # 1 PCP Anastasiya Monroe MD     Subjective:  Debbie Gilmore calculi. 5. Lesser incidental findings as above.         Xr Chest Ap Portable  (cpt=71010)    2/3/2017  CONCLUSION:  Nasogastric tube has been placed with slight advancement of the tube extending below the diaphragm in the region of the gastroesophageal ju

## 2017-02-03 NOTE — H&P
[facility]    History and Physical    Vibha Meyers Patient Status:  Inpatient    10/21/1970 MRN [id number]   Location [facility] 5SW/SE Attending Madaline Seip, MD   Hosp Day # 1 PCP Elias Starr MD     Date:  2/3/2017  Date Allergies    Prescriptions prior to admission:  diazepam (VALIUM) 10 MG Oral Tab Take 1 tablet (10 mg total) by mouth 4 (four) times daily.    Cyclobenzaprine HCl 10 MG Oral Tab TAKE TWO TABLETS BY MOUTH THREE TIMES DAILY AS NEEDED FOR MUSCLE SPASM   HYDROc total) rectally daily.    sucralfate (CARAFATE) 1 G Oral Tab TAKE 1 TABLET BY MOUTH EVERY 6 HOURS (Patient taking differently: as needed)   fluticasone-salmeterol (ADVAIR DISKUS) 250-50 MCG/DOSE Inhalation Aerosol Powder, Breath Activated Inhale 1 puff into 02/02/2017   ALB 3.6 02/02/2017   ALKPHO 96 02/02/2017   BILT 0.6 02/02/2017   TP 7.0 02/02/2017   AST 30 02/02/2017   ALT 20 02/02/2017   TSH 1.03 12/10/2015   LIP 18* 02/02/2017     Xr Abdomen, Obstructive Series (cpt=74020)    2/2/2017  CONCLUSION:  1.

## 2017-02-03 NOTE — CM/SW NOTE
refeffal made to Replaced by [facility] for information to be given to patient about DORS

## 2017-02-03 NOTE — CM/SW NOTE
Met with patient at bedside to explain the BPCI/Medicare program. Patient agreed with phone follow up for 3 months from [address] [place] [address] after discharge from [facility]. Patient was enrolled under DRG   390   . BPCI/Medicare letter and brochure provided.  Patient i

## 2017-02-03 NOTE — CONSULTS
[facility]    PATIENT'S NAME: Jayla Borjaz   ATTENDING PHYSICIAN: Yuly Child MD   CONSULTING PHYSICIAN: Radha Richardson MD   PATIENT ACCOUNT#:   [id number]    LOCATION:  [facility] [place] [address] #:   [id number]       DATE OF BIRTH: eventually she returned to [facility] approximately 3 or 4 years ago, at which time she had a bowel obstruction. I was consultant on that occasion and we treated her conservatively. Her episodes subsided.   In 2015, though, she had a simil lysis of adhesions and bowel resection, the last one being in 2015. She also had surgery of small bowel resection for a bleeding AV malformation? SOCIAL HISTORY:  The patient does not drink, does not use recreational drugs, and lives with family. NECK:  Supple. There is no lymphadenopathy. The carotid pulses are palpable bilaterally. The thyroid is not palpable. LUNGS:  Clear to auscultation. HEART:  Heart tones are within normal limits. Normal sinus rhythm. ABDOMEN:  Soft.   There are some 18:15:21  [facility] [id number]  [facility]/

## 2017-02-04 PROBLEM — R11.2 NAUSEA & VOMITING: Status: ACTIVE | Noted: 2017-01-01

## 2017-02-04 PROBLEM — K56.7 ILEUS (HCC): Status: ACTIVE | Noted: 2017-01-01

## 2017-02-04 NOTE — PROGRESS NOTES
[facility]    Progress Note    Sarthakrenetta Wynne Patient Status:  Inpatient    10/21/1970 MRN [id number]   Location [facility] 5SW/SE Attending Christopher Feldman MD   Hosp Day # 1 PCP Heather Mann MD       Subjective:   Shaq aguirre Only)(cpt=74177)    2/2/2017  CONCLUSION:  1. Dilated loops of bowel are present the proximally and in the right lower quadrant. Distal small bowel loops are collapsed. There is gradual tapering without distinct transition point.  Findings are suspicious fo

## 2017-02-04 NOTE — PROGRESS NOTES
[facility]    Progress Note    Buck Pyle Patient Status:  Inpatient    10/21/1970 MRN [id number]   Location [facility] 5SW/SE Attending Shira Deng MD   Hosp Day # 2 PCP Karina Vital MD     Subjective:     Jose Armandoo 452* 02/03/2017   CREATSERUM 0.73 02/03/2017   BUN 6* 02/03/2017    02/03/2017   K 4.0 02/04/2017    02/03/2017   CO2 24 02/03/2017   * 02/03/2017   CA 8.4* 02/03/2017   ALB 2.7* 02/03/2017   ALKPHO 79 02/03/2017   BILT 0.6 02/03/2017

## 2017-02-04 NOTE — PROGRESS NOTES
GI  PROGRESS NOTE    SUBJECTIVE: c/o migraine ha; no abd pain; lyla clears.       OBJECTIVE:  Temp:  [98 °F (36.7 °C)-98.5 °F (36.9 °C)] 98 °F (36.7 °C)  Pulse:  [101-106] 101  Resp:  [16-18] 18  BP: (126-137)/(87-98) 132/87 mmHg  Exam  Gen: No acute distr

## 2017-02-04 NOTE — PROGRESS NOTES
[facility] [place] [facility]    [facility] Note    Shirlene Calixto Patient Status:  Inpatient    10/21/1970 MRN [id number]   Location [facility] 5SW/SE Attending Amado Meza MD   Hosp Day # 2 PCP Aries Sanon MD       Subjective:   Complains of central mesenteric edema. 3. Postoperative changes of gastrectomy, cholecystectomy, and appendectomy are noted. 4. Bilateral nonobstructing renal calculi. 5. Lesser incidental findings as above.         Xr Chest Ap Portable  (cpt=71010)    2/3/2017  CONC

## 2017-02-05 NOTE — PROGRESS NOTES
[facility]    Progress Note    Gurmeet Salgado Patient Status:  Inpatient    10/21/1970 MRN [id number]   Location [facility] 5SW/SE Attending Zelda [place] MD   Hosp Day # 3 PCP Anastasiya Monroe MD     Subjective:     Jose Armandoo * 02/03/2017   CREATSERUM 0.73 02/03/2017   BUN 6* 02/03/2017    02/03/2017   K 4.0 02/04/2017    02/03/2017   CO2 24 02/03/2017   * 02/03/2017   CA 8.4* 02/03/2017   ALB 2.7* 02/03/2017   ALKPHO 79 02/03/2017   BILT 0.6 02/03/2

## 2017-02-05 NOTE — PROGRESS NOTES
GI  PROGRESS NOTE    SUBJECTIVE: c/o diffuse back pain; no migraine this am. 3 loose stools w/o blood; lyla diet but nauseated.  Wishes to have some of home meds restarted;       OBJECTIVE:  Temp:  [97.4 °F (36.3 °C)-98.4 °F (36.9 °C)] 97.9 °F (36.6 °C)  P

## 2017-02-06 NOTE — PHYSICAL THERAPY NOTE
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           NEUROLOGICAL FINDINGS      pt with abnormal reported sensation of bilateral feet and LE per pt worse on left  Reports feels less when touch distal LE    With li mobility  Pt with SBA to mod indep bed mobility      Transfers  SBA    Set up assist  With ambulation as above   CGA to min assist for safety         Patient End of Session: In bed;Needs met;Call light within reach;RN aware of session/findings; All patient mobility; Body mechanics; Coordination;Patient education;Range of motion;Transfer training;Balance training  Rehab Potential : Good  Frequency (Obs): 5x/week         CURRENT GOALS    Goal #1 Patient is able to demonstrate supine - sit EOB @ level: independen

## 2017-02-06 NOTE — PROGRESS NOTES
[facility]    GI Progress Note      Althjimena Salas Patient Status:  Inpatient    10/21/1970 MRN [id number]   Location [facility] 5SW/SE Attending Ronan Rodriguez MD   Hosp Day # 4 PCP Ekaterina Wills MD          SUBJECTIVE:     Idalia Michael

## 2017-02-06 NOTE — PLAN OF CARE
Cont. Iv fluids, up in halls w/physical therapy, diet advanced, see orders/notes,maintain comfotr, varies from calm/happy to anxious/crying. Emotional support offered.     PAIN - ADULT    • Verbalizes/displays adequate comfort level or patient's stated pain

## 2017-02-06 NOTE — DISCHARGE PLANNING
LIOR received for dc planning. RO spoke with the MD who states the pt will likely be ready for dc to home tomorrow. RO discussed possible services. MD feels pt will not be homebound, so her best option is for outpatient therapy.  RO will provide the pt with

## 2017-02-06 NOTE — PROGRESS NOTES
[facility]    Progress Note    Anuradha Marisol Patient Status:  Inpatient    10/21/1970 MRN [id number]   Location [facility] 5SW/SE Attending Edmar Goyal MD   Hosp Day # 4 PCP Elizabet Merida MD     Subjective:     Respiratory

## 2017-02-07 NOTE — SPIRITUAL CARE NOTE
Patient expressed desire to go home and return to her daily life.   provided empathic listening, and rosary at patient's request.

## 2017-02-07 NOTE — DISCHARGE SUMMARY
[facility]  Discharge Summary    Teddy Dillon Patient Status:  Inpatient    10/21/1970 MRN [id number]   Location [facility] 5SW/SE Attending Darling Casillas MD   Hosp Day # 5 PCP Eliza Causey MD     Date of Admission: 2017    Date of 0    morphINE Sulfate ER 30 MG Oral Tab CR  Take 1 tablet (30 mg total) by mouth every 8 (eight) hours. Qty: 90 tablet Refills: 0    Flurazepam HCl 30 MG Oral Cap  Take 2 capsules (60 mg total) by mouth nightly as needed for Sleep.   Qty: 60 capsule Refill tablet Refills: 3    fluticasone-salmeterol (ADVAIR DISKUS) 250-50 MCG/DOSE Inhalation Aerosol Powder, Breath Activated  Inhale 1 puff into the lungs every 12 (twelve) hours.       STOP taking these medications    diphenoxylate-atropine 2.5-0.025 MG Oral Ta

## 2017-02-07 NOTE — PHYSICAL THERAPY NOTE
PHYSICAL THERAPY TREATMENT NOTE - INPATIENT    Room Number: 548/548-A       Presenting Problem: hx of falls on admission  chronic nausea  hx of total gastrectomy  abdominal pain     Problem List  Principal Problem:    Abdominal pain, epigastric  Active Pr Score:  Raw Score: 18   PT Approx Degree of Impairment Score: 46.58%   Standardized Score (AM-PAC Scale): 43.63   CMS Modifier (G-Code): CK    FUNCTIONAL ABILITY STATUS  Gait Assessment   Gait Assistance: Minimum assistance  Distance (ft): 2 x 125  Assisti

## 2017-02-07 NOTE — PROGRESS NOTES
Non-formulary: HydrOXYzine Pamoate (VISTARIL) cap 50 mg  Changed to Formulary: HydrOXYzine HCl (ATARAX) tab 50 mg  Per p&t committee formulary substitution protocol.   Maria Isabel Singh [facility]

## 2017-02-07 NOTE — PROGRESS NOTES
[facility]    GI Progress Note      Ayanna Shell Patient Status:  Inpatient    10/21/1970 MRN [id number]   Location [facility] 5SW/SE Attending José Peña MD   Hosp Day # 5 PCP Jayson Estes MD          SUBJECTIVE:     No

## 2017-02-07 NOTE — PLAN OF CARE
PAIN - ADULT    • Verbalizes/displays adequate comfort level or patient's stated pain goal Progressing        SAFETY ADULT - FALL    • Free from fall injury Progressing        SKIN/TISSUE INTEGRITY - ADULT    • Skin integrity remains intact Progressing

## 2017-02-20 NOTE — PROGRESS NOTES
NEUROLOGICAL PHYSICAL THERAPY EVALUATION:   Referring Physician: Dr. Ranjana Reddy  Diagnosis: ataxia      Date of Onset: 2/9/2017 Date of Service: 2/20/2017     PATIENT SUMMARY   Kang Zamora is a 48year old y/o female who presents to therapy today with repor ASSESSMENT:    Alan Bonilla presents today with impaired balance and difficulty walking. Score on BBS and TUG indicative of an increased fall risk. Pt demonstrates improved stability with use of rollator.  When ambulating without rollator pt demonstrates ataxic g Placing alternate foot on stool   __1____13. Standing with one foot in front   __1____14.  Standing on one foot     Total __27_/56 (less than 46/56 = fall risk)    Gait deviations: decreased gait speed, circumduction L, decreased B foot clearance; without A care.    X___________________________________________________ Date____________________    Certification From: 9/66/0663  To:5/21/2017

## 2017-02-24 NOTE — PATIENT INSTRUCTIONS
Bell's Exercises- Do once per day    1. Stand up and sit down from a sturdy chair. Put a pillow in the chair if you need to. Put walker in front of you for safety. Lean forward and stand slowly, then get your balance and sit slowly.   Put your fingers

## 2017-02-24 NOTE — PROGRESS NOTES
Dx: Gait Instability         Number of Visits Seen/Authorized:  2/10 (Medicare)         Next MD visit: none scheduled  Precautions: Fall risk  Medication Changes since last visit?: No  Subjective: Pt.  Notices that if she puts a shirt over her head she lose

## 2017-02-27 NOTE — PROGRESS NOTES
Dx: Gait Instability         Number of Visits Seen/Authorized:  3/10 (Medicare)         Next MD visit: none scheduled  Precautions: Fall risk  Medication Changes since last visit?: No  Subjective: Pt reports compliance with HEP.  One fall during performance

## 2017-03-01 NOTE — PATIENT INSTRUCTIONS
Bell's Exercises- Do once per day    1. Stand up and sit down from a sturdy chair. Put walker in front of you for safety. Lean forward and stand slowly, then get your balance and sit slowly.   Put your fingers on the seat of your walker but don't push

## 2017-03-01 NOTE — PROGRESS NOTES
Dx: Gait Instability         Number of Visits Seen/Authorized:  4/10 (Medicare)         Next MD visit: none scheduled  Precautions: Fall risk  Medication Changes since last visit?: No  Subjective: Pt reports that the home exercises are going well, and she

## 2017-03-03 NOTE — PROGRESS NOTES
Dx: Gait Instability         Number of Visits Seen/Authorized:  5/10 (Medicare)         Next MD visit: none scheduled  Precautions: Fall risk  Medication Changes since last visit?: No  Subjective: Pt reports that she is not feeling well today, may be comin

## 2017-03-07 NOTE — PROGRESS NOTES
Dx: Gait Instability         Number of Visits Seen/Authorized:  6/10 (Medicare)         Next MD visit: none scheduled  Precautions: Fall risk  Medication Changes since last visit?: No  Subjective: Pt reports that she is not feeling well today, may be comin

## 2017-03-10 NOTE — TELEPHONE ENCOUNTER
Ortiz Hopkins is calling about a spinal tap that Dr Kevin Tan wanted performed.  Please review and call patient

## 2017-03-15 NOTE — PROGRESS NOTES
Dx: Gait Instability         Number of Visits Seen/Authorized:  7/10 (Medicare)         Next MD visit: none scheduled  Precautions: Fall risk  Medication Changes since last visit?: No  Subjective: Back still sore from fall last week, pain 5/10.  R knee feel

## 2017-03-17 NOTE — PROGRESS NOTES
Dx: Gait Instability         Number of Visits Seen/Authorized:  8/10 (Medicare)         Next MD visit: none scheduled  Precautions: Fall risk  Medication Changes since last visit?: No  Subjective: Pt reports 2 LOB since last session, no falls.  Pt reports l

## 2017-03-20 NOTE — PROGRESS NOTES
Dx: Gait Instability         Number of Visits Seen/Authorized:  9/10 (Medicare)         Next MD visit: none scheduled  Precautions: Fall risk  Medication Changes since last visit?: No  Subjective: Pt currently complaining of a migraine 8/10 with nausea.  To

## 2017-05-03 NOTE — PROGRESS NOTES
NEUROLOGICAL PHYSICAL THERAPY EVALUATION:   Referring Physician: Dr. Howard Courser  Diagnosis: Gait Instability, back pain  Date of Onset: 3/20/2017 Date of Service: 5/3/2017     PATIENT Marcy Rogers is a 48year old y/o female who presents to therapy rollator pt demonstrates ataxic gait pattern. Pt reports balance and mobility has gradually, consistently declined over the past 2 years.   Pt. would benefit from skilled Physical Therapy to address the above impairments to improve safety and independence w with one foot in front   __1____14.  Standing on one foot     Total __27_/56 (less than 46/56 = fall risk)    Gait deviations: decreased gait speed, circumduction L, decreased B foot clearance; without AD: wide RISA, ataxic movements   Stairs: reciprocal wit

## 2017-05-10 NOTE — PROGRESS NOTES
Dx: Gait Instability         Number of Visits Seen/Authorized:  2/10 (Medicare)         Next MD visit: none scheduled  Precautions: Fall risk  Medication Changes since last visit?: No  Subjective: Pt currently complaining of a migraine, has lasted a week,

## 2017-05-12 NOTE — PROGRESS NOTES
Dx: Gait Instability         Number of Visits Seen/Authorized:  3/10 (Medicare)         Next MD visit: none scheduled  Precautions: Fall risk  Medication Changes since last visit?: No  Subjective: Pt reports falling yesterday, right side of trunk sore but

## 2017-05-17 PROBLEM — E87.6 HYPOKALEMIA: Status: ACTIVE | Noted: 2017-01-01

## 2017-05-17 PROBLEM — R29.6 FREQUENT FALLS: Status: ACTIVE | Noted: 2017-01-01

## 2017-05-17 PROBLEM — R11.2 INTRACTABLE VOMITING WITH NAUSEA, UNSPECIFIED VOMITING TYPE: Status: ACTIVE | Noted: 2017-01-01

## 2017-05-18 NOTE — PLAN OF CARE
Patient heart rate has been ranging from 122 to 125. Patient is asymptomatic and denies any chest pain or discomfort. Patient resting comfortably in the bed. Notified Dr. Chi Manuel regarding intermittent elevated heart rate with patient being asymptomatic.  Obt

## 2017-05-18 NOTE — CONSULTS
[facility]    PATIENT'S NAME: Torrie Art   ATTENDING PHYSICIAN: Lisa Estrella MD   CONSULTING PHYSICIAN: Miguel Ángel Jesus MD   PATIENT ACCOUNT#:   [id number]    LOCATION:  [address] [place] [address] #:   [id number]       DATE OF BIRTH:  8 ulcer disease, surgery for bowel adhesions, AV malformation.     MEDICATIONS:  Present medications are pantoprazole, trazodone, _______ metoprolol, albuterol, Dulcolax, Advair, Zofran, Ativan, Benadryl, Dilaudid, BuSpar, Flexeril, Valium, Dalmane, Norco, Re Relpax is not available in the hospital.  She is on various other serotonin agents but has been on these with Imitrex without any serotonin syndrome symptoms. As preventive medicine, will start her on Topamax 25 mg p.o. b.i.d.   This can be gradually incre

## 2017-05-18 NOTE — PLAN OF CARE
Patient bed alarm sound off. Immediately went into room. Patient had jumped out of the bed and was standing up. Patient gait was very unsteady and sweating profusely. I immediately assess patient's mental status. Patient was alert and oriented x 4.  Patient

## 2017-05-18 NOTE — ED PROVIDER NOTES
Patient Seen in: [facility]    History   Patient presents with:  Nausea/vomiting  Abdominal Pain    Stated Complaint: bowel obstruction     HPI    48year old female with multiple medical problems including abdominal pain, multiple recurrent bow (ATIVAN) 2 MG Oral Tab,  Take 1 tablet (2 mg total) by mouth 2 (two) times daily as needed for Anxiety. morphINE Sulfate ER 30 MG Oral Tab CR,  Take 1 tablet (30 mg total) by mouth every 8 (eight) hours.    Ondansetron HCl (ZOFRAN) 8 MG tablet,  TAKE 1 TA (10 mg total) rectally daily. Patient taking differently: Place 10 mg rectally daily as needed. fluticasone-salmeterol (ADVAIR DISKUS) 250-50 MCG/DOSE Inhalation Aerosol Powder, Breath Activated,  Inhale 1 puff into the lungs every 12 (twelve) hours. wheezes. Abdominal: Soft. Normal appearance. She exhibits no distension. There is no tenderness. There is no guarding. No reproducible pain   Musculoskeletal: Normal range of motion. She exhibits no tenderness.    Neurological: She is alert and oriented normal limits   EMH POCT PREGNANCY URINE - Normal   CBC WITH DIFFERENTIAL WITH PLATELET    Narrative: The following orders were created for panel order CBC WITH DIFFERENTIAL WITH PLATELET.   Procedure                               Abnormality         St tachycardia            MDM     Pulse Ox: 99%, Normal, RA    Cardiac Monitor: Pulse Readings from Last 1 Encounters:  05/17/17 : 122  , sinus, normal for rhythm, tachycardic for rate    Radiology findings: Xr Abdomen, Obstructive Series (cpt=74020)    5/17/ pancreatic atrophy. Pancreas otherwise grossly  unremarkable. 6. Uterus and ovaries are not well seen. Correlate with prior surgical history. No adnexal mass or abnormal pelvic free fluid. 7. Extensive demineralization consistent with osteoporosis.  There a 5/17/2017 Unknown

## 2017-05-18 NOTE — H&P
[facility] HOSP - [facility]    History and Physical    Alvarez Griffin Patient Status:  Observation    10/21/1970 MRN [id number]   Location [address] [place] [address] Attending Yolanda Jackson MD   Hosp Day # 1 PCP Kathie Cochran MD     Date:  2017  Date of Comment Small bowel resection for bleeding AV malformation    OTHER SURGICAL HISTORY      Comment Exploratory laparotomy with adhesiolysis and segmental jejunal small bowel resection with anastomosis x 2     Family History   Problem Relation Age of Onset Prochlorperazine Maleate (COMPAZINE) 10 mg tablet TAKE 1 TABLET(10 MG) BY MOUTH EVERY 6 HOURS AS NEEDED FOR NAUSEA   Pantoprazole Sodium 40 MG Oral Tab EC Take 1 tablet (40 mg total) by mouth every morning before breakfast.   TraZODone HCl 100 MG Oral Ta No scleral icterus. Neck: Neck supple. No JVD present. Cardiovascular: Regular rhythm and normal heart sounds. Exam reveals no friction rub. No murmur heard. Edema not present. Pulmonary/Chest: Effort normal. She has no wheezes.  She has no rales studies without significant gross interval change. Evaluation limited by inadequate contrast opacification and distention. 3. Areas of wall thickening of the rectum, transverse and right colon.  Findings may reflect underdistention although I can't exclude syndrome Nonspecific ST and T wave abnormality When compared with ECG of 02/02/2017 12:58:46 No significant changes have occurred Electronically signed on 05/17/2017 at 15:51 by Tereso Villalobos MD      Assessment/Plan:     Ileus [facility])  Admit/ivf's/ surgica

## 2017-05-19 NOTE — PROCEDURES
[address]  [facility], [address]      PATIENT'S NAME: Ada Menjivar   ATTENDING PHYSICIAN: Tha Zendejas MD   PATIENT ACCOUNT #: [de-identified] LOCATION: [address] [place] [address] RECORD #: [id number] DATE OF BIRTH: [birth year]

## 2017-05-19 NOTE — PROGRESS NOTES
[facility]    Progress Note    Tara Gibbs Patient Status:  Inpatient    10/21/1970 MRN [id number]   Location [facility] Attending Johnathan Mccabe MD   Hosp Day # 2 PCP Severo Child, MD       Subjective:   Tara Gibbs is intractable  She has had a migraine since last night. She tried Maxalt in the past and it was not effective, so I will restart her on her Imitrex. Topamax can be used for prophylaxis.         Medications:     Current Facility-Administered Medications:  Po 05/17/2017    05/17/2017   K 3.2* 05/19/2017    05/17/2017   CO2 19* 05/17/2017   * 05/17/2017   CA 8.6 05/17/2017   ALB 2.4* 05/17/2017   ALKPHO 142* 05/17/2017   BILT 0.5 05/17/2017   TP 5.0* 05/17/2017   AST 66* 05/17/2017   ALT 45 05 abscess. 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 Correlate with pancreatic enzymes. Mild diffuse pancreatic atrophy. Pancreas otherwise grossly  unremarkable. 6. Uterus and ovaries are not well seen. Correlate with prior surgical history.  No adnexal

## 2017-05-19 NOTE — PLAN OF CARE
Problem: Patient/Family Goals  Goal: Patient/Family Long Term Goal  Patient’s Long Term Goal:    Interventions:  -  - See additional Care Plan goals for specific interventions   Outcome: Progressing  (1) Patient receives adequate hydration by oral and intr

## 2017-05-19 NOTE — DIETARY NOTE
ADULT NUTRITION INITIAL ASSESSMENT    Pt is at high nutrition risk. Pt meets malnutrition criteria.       CRITERIA FOR MALNUTRITION DIAGNOSIS:  Criteria for non-severe malnutrition diagnosis: chronic illness related to body fat mild depletion and related t past 336 hrs:   Weight   05/17/17 2323 48.988 kg (108 lb)   05/17/17 1349 46.267 kg (102 lb)       GASTROINTESTINAL PROBLEMS: N and V resolved; tolerating diet. Pt does have h/o bowel obstruction with partial resection, as noted above.      FOOD/NUTRITION R

## 2017-05-19 NOTE — PROGRESS NOTES
[facility]    Progress Note    Gloria Paulino Patient Status:  Inpatient    10/21/1970 MRN [id number]   Location [address] [facility] Attending Godfrey Clayton MD   Hosp Day # 2 PCP Summer Martines MD     Subjective:     Gastrointestinal 05/17/2017   HCT 37.6 05/17/2017   * 05/17/2017   CREATSERUM 0.75 05/17/2017   BUN 13 05/17/2017    05/17/2017   K 3.2* 05/19/2017    05/17/2017   CO2 19* 05/17/2017   * 05/17/2017   CA 8.6 05/17/2017   ALB 2.4* 05/17/2017   ALKPH mesenteric infiltration, nonspecific without localized ascites or intra-abdominal/pelvic abscess. 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 Correlate with pancreatic enzymes. Mild diffuse pancreatic atrophy. Pancreas otherwise grossly  unremarkable.  6. patients current state of illness, I anticipate that, after discharge, patient will require TBD.         Mykel Quintanilla MD  5/19/2017

## 2017-05-20 NOTE — PROGRESS NOTES
[facility]    Progress Note    Anna  Patient Status:  Inpatient    10/21/1970 MRN [id number]   Location [address] [facility] Attending Misael Yu MD   Hosp Day # 3 PCP Julienne Faith MD     Subjective:copmplaing of pain better , on imitrex       CHRONIC PAIN SYNDROME- pain consult   SEVERE ANXIETY/DEPRESION- continue with current meds   SEVERE MALNUTRITION  Continue with supplement  ptot      Results:     Lab Results  Component Value Date   WBC 9.3 05/20/2017   HGB 10.5*

## 2017-05-20 NOTE — PLAN OF CARE
Problem: Patient/Family Goals  Goal: Patient/Family Long Term Goal  Patient’s Long Term Goal: return home and able to baseline activities    Interventions:  - continue medical management  - See additional Care Plan goals for specific interventions   Outcom

## 2017-05-20 NOTE — CHRONIC PAIN
[facility]  Report of Consultation    Dyan Wynne Patient Status:  Inpatient    10/21/1970 MRN [id number]   Location [facility]W Attending Christopher Feldman MD   Hosp Day # 3 PCP Heather Mann MD     Date of Admission:  20 not drink alcohol or use illicit drugs.     Allergies:  No Known Allergies    Medications:    Current facility-administered medications:   •  SUMAtriptan Succinate (IMITREX) tab 100 mg, 100 mg, Oral, Q2H PRN  •  ondansetron HCl (ZOFRAN) injection 8 mg, 8 mg 05/20/2017   HGB 10.5 05/20/2017   HCT 31.8 05/20/2017    05/20/2017   K 3.9 05/19/2017       Imaging:  CT Scan    Impression:  Patient Active Problem List:     Anxiety     Migraine     Knee pain     Chronic pain syndrome     Abdominal pain     Knee

## 2017-05-20 NOTE — PLAN OF CARE
Patient/Family Goals    • Patient/Family Long Term Goal- home planned for patient at discharge, as per MD order. Progressing    • Patient/Family Short Term Goal- patient states her discomfort has improved Progressing        Patient tolerating diet well.   B

## 2017-05-20 NOTE — CONSULTS
[facility]    PATIENT'S NAME: Jess Shepard   ATTENDING PHYSICIAN: Sarah Fox MD   CONSULTING PHYSICIAN: Lamonte Olivera MD   PATIENT ACCOUNT#:   [id number]    LOCATION:  [address] [place] [address] RECORD #:   [id number]       DATE OF BIRTH: diverticulitis/diverticulosis. Prior appendectomy. No colonic mass or obstruction. 4.   Extensive prior small bowel resections and anastomosis. Prominent small bowel loops as seen on previous obstructive series without definite obstruction.   No free ai paralytic ileus with small-bowel distention. As the abdomen was soft, we felt that observation is again warranted, as it has been in the past.  As stated above, this patient has been seen by me on several occasions with the same clinical picture.   Far domonique migraine headaches, opioid dependence. She suffers from significant anxiety, depression, bronchial asthma. She has had renal stones, lumbar radiculopathy, and chronic anemia due to her chronic disease state.     MEDICATIONS:  At home, Tylenol, Fioricet, B x3.  She was chatty and apparently in good spirits. She otherwise complained of minimal to no abdominal pain. HEENT:  Head within normal limits for age, race, and sex. Ophthalmological examination grossly intact.   The pupils are equal and react to light

## 2017-05-21 NOTE — PHYSICAL THERAPY NOTE
PHYSICAL THERAPY EVALUATION - INPATIENT     Room Number: 964/546-H  Evaluation Date: 5/21/2017  Type of Evaluation: Initial  Physician Order: PT Eval and Treat    Presenting Problem: Ileus  Reason for Therapy: Mobility Dysfunction and Discharge Planangeliquen Medical History  Past Medical History   Diagnosis Date   • Abdominal pain    • Anxiety    • Depression    • Asthma    • Migraine    • CELESTINO (obstructive sleep apnea)    • Gastroenteritis    • Anemia    • Bowel obstruction (HCC)    • Sciatica    • Small bowel mechanics;Repositioning    COGNITION  · Overall Cognitive Status:  WFL - within functional limits  · Safety Judgement:  decreased awareness of need for assistance and decreased awareness of need for safety    RANGE OF MOTION AND STRENGTH ASSESSMENT  Upper bed;Needs met;Call light within reach;RN aware of session/findings; All patient questions and concerns addressed; Alarm set    CURRENT GOALS    Goals to be met by: 6/4/17  Patient Goal Patient's self-stated goal is: \"go home to my apt\"   Goal #1 Patient is

## 2017-05-21 NOTE — PLAN OF CARE
Patient/Family Goals    • Patient/Family Long Term Goal- as of this time plan home at discharge Progressing    • Patient/Family Short Term Goal- pain improving patient has scheduled and prn medication for pain.  Progressing        SAFETY ADULT - FALL    • F

## 2017-05-21 NOTE — PROGRESS NOTES
[facility]    Progress Note    Silvia Stahl Patient Status:  Inpatient    10/21/1970 MRN [id number]   Location [facility] Attending Amelia Lea MD   Hosp Day # 4 PCP Baker Dance, MD     Subjective: sleepy , still has p without status migrainosus, not intractable  Doing better , on imitrex       CHRONIC PAIN SYNDROME- pain consult noted   SEVERE ANXIETY/DEPRESION- continue with current meds   SEVERE MALNUTRITION  Continue with supplement,, dietary consult   ptot      Resu

## 2017-05-21 NOTE — CHRONIC PAIN
migranes absent  Doing well with that   Some abd pain   P/E  abd soft  Migrane history Anxiety Chronic pain syndrome  CPM

## 2017-05-22 NOTE — PROGRESS NOTES
@0664 patient with elevated heart rate in the upper 130's, bp 118/77, no c/o chest pain, no sob. Md paged, orders received for d-dimer, ekg, telemonitoring, and lovenox.

## 2017-05-22 NOTE — CM/SW NOTE
Met with patient at bedside to discuss plans after discharge. Patient states she plans to return home and continue with her outpatient therapy as before she was admitted. Patient asking for caregiver services for home to help with cleaning and chores.   MARQUITA

## 2017-05-22 NOTE — PHYSICAL THERAPY NOTE
PHYSICAL THERAPY TREATMENT NOTE - INPATIENT    Room Number: 125/856-A       Presenting Problem: Ileus    Problem List  Principal Problem:    Ileus ([address]  Active Problems:    Intractable vomiting with nausea, unspecified vomiting type    Frequent falls Fair -  Dynamic [address]  Room air    AM-PAC '6-Clicks' INPATIENT SHORT FORM - BASIC MOBILITY  How much difficulty does the patient currently have. ..  -   Turning over in bed (including adjusting bedclothes, sheets and blanket Goal #4    Current Status   N/A   Goal #5  Patient to demonstrate independence with home activity/exercise instructions provided to patient in preparation for discharge.    Goal #5    Current Status   IN PROGRESS   Goal #6      Goal #6  Current Status

## 2017-05-22 NOTE — PROGRESS NOTES
[facility]  Inpatient Pain Management Progress Note      Patient name: Bcuk Flank 48year old female  : 10/21/1970  MRN: [id number]    Diagnosis: migraine, ileus    Reason for Consult: history of chronic pain    Current hospital day:

## 2017-05-22 NOTE — DISCHARGE PLANNING
SW met w/ pt to discuss discharge planning. Pt lives at home alone and uses a walker to ambulate. Sw discussed MD recommendation of Erin Alexander; pt agreeable. Pt was going to outpt PT. SW placed referral to [facility] - Erin Alexander orders in. Pt anticipated to d/c today.     Velma

## 2017-05-22 NOTE — PLAN OF CARE
Patient/Family Goals    • Patient/Family Long Term Goal Progressing    • Patient/Family Short Term Goal Progressing        SAFETY ADULT - FALL    • Free from fall injury Progressing        PRN norco given. Possible discharge tonight per Dr. Vera Dominguez.  [facility] Pt/ot

## 2017-05-23 NOTE — PLAN OF CARE
Problem: Patient/Family Goals  Goal: Patient/Family Long Term Goal  Patient’s Long Term Goal: return home and able to baseline activities    Interventions:  - continue medical management  - See additional Care Plan goals for specific interventions   Outcom relaxation techniques  - Monitor for opioid side effects  - Notify MD/LIP if interventions unsuccessful or patient reports new pain   Outcome: Adequate for Discharge  1. Pt will be discharged today  2. Fall precautions in place  3.  Addressed pt's pain conc

## 2017-05-23 NOTE — PROGRESS NOTES
[facility]    Progress Note    Bartologeraldine Urenatrevon Patient Status:  Inpatient    10/21/1970 MRN [id number]   Location [facility] 5SW/SE Attending Madaline Seip, MD   Hosp Day # 6 PCP Elias Starr MD     Subjective:   Subjective: along the right posterior hemidiaphragmatic surface appears to correspond to a small quantity of herniated subpleural fat. Additional small nodules in micronodules are scattered throughout the lungs.  Consider a follow up examination in 6-12 months for reas

## 2017-05-23 NOTE — PHYSICAL THERAPY NOTE
PHYSICAL THERAPY TREATMENT NOTE - INPATIENT    Room Number: 210/778-O       Presenting Problem: Ileus    Problem List  Principal Problem:    Ileus ([address]  Active Problems:    Intractable vomiting with nausea, unspecified vomiting type    Frequent falls ASSESSMENT   Ratin  Location: L knee, R upper thorax, belly  Management Techniques:  Activity promotion;Repositioning    BALANCE                                                                                                                     Static S Goal #2   Patient is able to demonstrate transfers Sit to/from Stand at assistance level: modified independent with walker - rolling       Goal #2  Current Status   SBA     Goal #3   Patient is able to ambulate 300 feet with assist device: walker - rolli

## 2017-05-23 NOTE — PROGRESS NOTES
[facility]  Inpatient Pain Management Progress Note      Patient name: Teddy Dillon 48year old female  : 10/21/1970  MRN: [id number]    Diagnosis: migraine, ileus    Reason for Consult: history of chronic pain    Current hospital day:

## 2017-05-23 NOTE — PLAN OF CARE
Problem: Patient/Family Goals  Goal: Patient/Family Long Term Goal  Patient’s Long Term Goal: return home and able to baseline activities    Interventions:  - continue medical management  - See additional Care Plan goals for specific interventions   Outcom reported right rib cage pain. Received PRN dilaudid with effective results.

## 2017-05-23 NOTE — HOME CARE LIAISON
PATIENT IS 48YEAR OLD FEMALE TREATED FOR SMALL BOWEL OBSTRUCTION. MET WITH PATIENT AT BEDSIDE TO DISCUSS [address].  PATIENT REPORTS HER ULTIMATE GOAL IS TO GO BACK TO OUTPATIENT THERAPY BUT IS AGREEABLE WITH [facility] RN/PT PRIOR TO RETURN TO [facility]

## 2017-06-22 NOTE — DISCHARGE SUMMARY
[facility]  Discharge Summary    López Valdez Patient Status:  Inpatient    10/21/1970 MRN [id number]   Location [facility] 5SW/SE Attending No att. providers found   Hosp Day # 6 PCP Mykel Quintanilla MD     Date of Admission: 2017 (10 mg total) by mouth 4 (four) times daily. , Script printed, Disp-120 tablet, R-0    Cyclobenzaprine HCl 10 MG Oral Tab  TAKE TWO TABLETS BY MOUTH THREE TIMES DAILY AS NEEDED FOR MUSCLE SPASM, Normal, Disp-270 tablet, R-0    HYDROcodone-acetaminophen (NOR (CYMBALTA) 60 MG Oral Cap DR Particles  Take 1 capsule (60 mg total) by mouth daily. , Script printed, Disp-90 capsule, R-1    sucralfate 1 g Oral Tab  TAKE 1 TABLET BY MOUTH tid with meals, Normal, Disp-90 tablet, R-3    Metoprolol Succinate ER 50 MG Oral

## 2017-07-20 PROBLEM — G95.9 MYELOPATHY (HCC): Status: ACTIVE | Noted: 2017-01-01

## 2017-07-20 NOTE — PROGRESS NOTES
Gloria Paulino : 10/21/1970     HPI:   Patient presents with:  Hospital F/U: New patient.  F/U from hospital on 17 for falling and fractured ribs seen at [facility]. Since discharged patient states she has fallen a few times and denies any in BY MOUTH THREE TIMES DAILY Disp: 90 tablet Rfl: 1   TraZODone HCl 100 MG Oral Tab Take 2 tablets (200 mg total) by mouth nightly.  Disp: 60 tablet Rfl: 3   SUMAtriptan Succinate 100 MG Oral Tab TAKE 1 TABLET BY MOUTH IMMEDIATELY AT ONSET OF MIGRAINE. MAY RE Inhaler Rfl: 3   bisacodyl (DULCOLAX) 10 MG Rectal Suppos Place 1 suppository (10 mg total) rectally daily.  (Patient taking differently: Place 10 mg rectally daily as needed.  ) Disp: 28 suppository Rfl: 0   fluticasone-salmeterol (ADVAIR DISKUS) 250-50 MC Comment: Small bowel resection for bleeding AV                malformation  No date: OTHER SURGICAL HISTORY      Comment: Exploratory laparotomy with adhesiolysis and                segmental jejunal small bowel resection with                anastomosis x distress  CV: No  Evidence of Carotid Bruits, Regular Rate and Rhythm. Neuro:  Higher Integrative Functions:  Alert and cooperative, with normal attention span and concentration. Speech and language normal.  Oriented times three.      Cranial Nerves: II- much.    Return in about 1 month (around 8/20/2017). Sanjana Richards.  Kumar Deutsch MD

## 2017-08-07 NOTE — IMAGING NOTE
PT PRESENTS IN DI FOR LUMBAR PUNCTURE. PT IS ALERT AND ORIENTED. VS BP 96/63  (NOTED IN SNAPSHOT VITALS HISTORY MONTH OF July BP RANGE /60-70 AND HR 100BPM). PT STATES LP FOR COMPLAINTS OF BILATERAL SWOLLEN LOWER LEGS WITH TINGLING.  PT IS PALE

## 2017-09-06 NOTE — PROGRESS NOTES
Anna   : 10/21/1970  Age 48year old  female patient is admitted to [place] [address] for strengthening and rehabiliation on 2017 from [facility]     Chief complaint: Weakness, frequent falls, ataxia    HPI   48year old female  who was Anemia    • Anxiety    • Asthma    • Bowel obstruction (HCC)    • Depression    • Essential hypertension    • Gastroenteritis    • Migraine    • CELESTINO (obstructive sleep apnea)    • PUD (peptic ulcer disease)    • Sciatica    • Small bowel obstruction (HCC) Anxiety. Disp: 60 tablet Rfl: 0   HYDROcodone-acetaminophen (NORCO)  MG Oral Tab Take 2 tablets by mouth every 6 (six) hours as needed for Pain.  Disp: 240 tablet Rfl: 0   Cyclobenzaprine HCl 10 MG Oral Tab TAKE TWO TABLETS BY MOUTH THREE TIMES DAILY TABLET(10 MG) BY MOUTH EVERY 6 HOURS AS NEEDED FOR NAUSEA Disp: 30 tablet Rfl: 1   DULoxetine HCl (CYMBALTA) 60 MG Oral Cap DR Particles Take 1 capsule (60 mg total) by mouth daily.  Disp: 90 capsule Rfl: 1   topiramate 25 MG Oral Tab Take 1 tablet (25 mg t abrasions to BUE and BLE   EYES: PERRLA, EOMI, sclera anicteric, conjunctiva normal; there is no nystagmus, no drainage from eyes  HENT: normocephalic; normal nose, no nasal drainage, mucous membranes pink, moist, pharynx no exudate, no visible cerumen.   Barron Model LP 8/7 (non reactive CSF VDRL, NGTD, colorless)   -Continue Vit D3 and mag oxide     5. Chronic anxiety  -Continue Ativan  -Psych consult in rehab    6.  Chronic pain/lumbar radiculopathy  -physiatry to follow in rehab: added Neurontin 100mg TID   -Continue

## 2017-09-07 NOTE — PROGRESS NOTES
Gurmeet Salgado  : 10/21/1970  Age 48year old  female patient is admitted to [place] [address] for strengthening and rehabiliation on 2017 from [facility]      Chief complaint: Weakness, frequent falls, ataxia    HPI   48year old female  who wa Prescriptions:  diazepam (VALIUM) 10 MG Oral Tab Take 1 tablet (10 mg total) by mouth 4 (four) times daily.  Disp: 120 tablet Rfl: 0   Cyclobenzaprine HCl 10 MG Oral Tab TAKE TWO TABLETS BY MOUTH THREE TIMES DAILY AS NEEDED FOR MUSCLE SPASM Disp: 270 tablet tablet Take 1 tablet (8 mg total) by mouth 3 (three) times daily as needed. TAKE 1 TABLET(8 MG) BY MOUTH THREE TIMES DAILY Disp: 90 tablet Rfl: 1   TraZODone HCl 100 MG Oral Tab Take 2 tablets (200 mg total) by mouth nightly.  Disp: 60 tablet Rfl: 3   OLANZ bleeding  ENDOCRINE: denies excessive thirst or urination; denies unexpected wt gain or wt loss  ALLERGY/IMM.: denies food or seasonal allergies        PHYSICAL EXAM:  GENERAL HEALTH: Frail thin appearing female in no acute distress   LINES, TUBES, DRAINS: to see in rehab  -Continue Augmentin  -RT to see in rehab  3.  Chronic abd pain/nausea  -history of multiple abd surgeries  -Monitor for daily BM  -Senna S tabs Q HS  -Miralax daily  -Dulcolax supp PRN  -Zofran 8mg PO TID PRN  -Reglan 10 mg PO TID before me

## 2017-09-08 NOTE — PROGRESS NOTES
López Valdez  : 10/21/1970  Age 48year old  female patient is admitted to [address] for strengthening and rehabiliation on 2017 from [facility]      Chief complaint: Weakness, frequent falls, ataxia, chronic opoid use     HPI   48 Known Allergies    CODE STATUS:  Full Code    ADVANCED CARE PLANNING TEAM: Requires family care conference this week     CURRENT MEDICATIONS: Reviewed:     Current Outpatient Prescriptions:  gabapentin 100 MG Oral Cap Take 100 mg by mouth 3 (three) times d (VALIUM) 10 MG Oral Tab Take 1 tablet (10 mg total) by mouth 4 (four) times daily. Disp: 120 tablet Rfl: 0   morphINE Sulfate ER 30 MG Oral Tab CR Take 1 tablet (30 mg total) by mouth every 8 (eight) hours.  Disp: 90 tablet Rfl: 0   Flurazepam HCl 30 MG Ora discharge  MUSCULOSKELETAL:BLE weakness   NEURO:no sensory or motor complaint: occasional headaches  PSYCHE: +anxiety history   HEMATOLOGY:denies excessive bleeding  ENDOCRINE: denies excessive thirst or urination; denies unexpected wt gain or wt loss  ALL O  -Monitor for worsening BLE weakness  -Repeat labs on Monday 9/11     2. Aspiration Pneumonia  -VS Q shift  -Have pt sit up in chair for all meals  -Speech to see in rehab  -Off Augmentin on 9/7  -RT to see in rehab  3.  Chronic abd pain/nausea  -history

## 2017-09-11 NOTE — PROGRESS NOTES
Ricky Cain  : 10/21/1970  Age 48year old  female patient is admitted to [place] [address] for strengthening and rehabiliation on 2017 from [facility]     Chief complaint: Weakness, frequent falls, ataxia, chronic opoid use    HPI  48 year o plan this week     CURRENT MEDICATIONS: Reviewed and updated. Neurontin increased to 200mg TID    Current Outpatient Prescriptions:  gabapentin 100 MG Oral Cap Take 100 mg by mouth 3 (three) times daily.  Disp:  Rfl:    METOCLOPRAMIDE HCL 10 MG Oral Tab TIA Inhale 2 puffs into the lungs every 6 (six) hours as needed for Wheezing. Disp: 1 Inhaler Rfl: 3   diazepam (VALIUM) 10 MG Oral Tab Take 1 tablet (10 mg total) by mouth 4 (four) times daily.  Disp: 120 tablet Rfl: 0   morphINE Sulfate ER 30 MG Oral Tab CR T or motor complaint: occasional headaches  PSYCHE: +anxiety history   HEMATOLOGY:denies excessive bleeding  ENDOCRINE: denies excessive thirst or urination; denies unexpected wt gain or wt loss  ALLERGY/IMM.: denies food or seasonal allergies        PHYSICA in rehab  3.  Chronic abd pain/nausea-stable   -history of multiple abd surgeries  -Monitor for daily BM  -Senna S tabs Q HS  -Miralax daily  -Dulcolax supp PRN  -Zofran 8mg PO TID PRN  -Reglan 10 mg PO TID before meals     4. Ataxia-improving   -VS Q shift

## 2017-09-21 NOTE — PROGRESS NOTES
Raylene Severin, 10/21/1970, 48year old, female is being discharged from [place] [address]  for strengthening and rehabiliation on 9/1/2017 from [facility]     Date of Admission to [place] 9/1/17    Date of Discharge from [facility] weights, list of bowel movements,  and to change positions slowly due to her history of ataxia and opoid use. PT verbalized understanding of these recommendations.  Discharging home with [facility] PT/OT/RN/SW, pt reports working previously wit cerumen.   NECK: supple; FROM; no JVD, no TMG, no carotid bruits  RESPIRATORY:clear to percussion and auscultation  CARDIOVASCULAR: S1, S2 normal, RRR; no S3, no S4;   ABDOMEN:  normal active BS+, soft, nondistended; no organomegaly, no masses; no bruits; n in rehab: increased  Neurontin 300mg QID Bryson Parma 9/18  -Continue [place]: DO NOT RESTART  MS Contin per Dr Diane Cha  -Continue Lidoderm: has been refusing though  -PT. OT   -Continue Flexeril      7.  Chronic headaches-stable   -Continue sumatriptan   -Continue to mo

## 2017-09-24 NOTE — PROGRESS NOTES
48 [facility] \ [birth year] \RE PTE DILAN BARROW  AT HOME HAVE QUESTION PTE OF DR Elayne Marin Direct [[id number]] spoke to corner and advised him that I do not know the patient well.   We will try to get message to Dr. Figueroa Letters to s

## 2025-04-14 NOTE — PROGRESS NOTES
Vibha Meyers  : 10/21/1970  Age 48year old  female patient is admitted to [facility] for strengthening and rehabiliation on 2017 from [facility]    Admitted to [place] [facility] on 2017 from [facility]    Chief complaint: Weakness, that. Bedside commode to be placed in room to help patient with urination at night (reports that is takes too long for staff to come help at night).  Vitals stable: since starting Metoprolol, her heart rate has been trending down, denies headache or visual Oral Tab TAKE 1 TABLET BY MOUTH NIGHTLY Disp: 30 tablet Rfl: 3   Prochlorperazine Maleate (COMPAZINE) 10 mg tablet TAKE 1 TABLET(10 MG) BY MOUTH EVERY 6 HOURS AS NEEDED FOR NAUSEA Disp: 30 tablet Rfl: 1   lidocaine 5 % External Patch Place 2 patches onto t unusual skin lesions or rashes  WOUNDS: scattered abrasions and healing bruises on BUE and BLE from frequent falls   EYES:no visual complaints or deficits  HENT: denies nasal congestion, sinus pain or sore throat;  RESPIRATORY: denies shortness of breath, Creatinine 0.52, Bilirubin 0.23, , K 4.6 Chloride 102, eGFR>60  Urine 9/10: SG 1.026, PH 6.5, Trace protein, all other aspects negative. RBC 5, WBC 3  Repeat labs 9/18  9/10: C diff negative      SEE PLAN BELOW  1.  Rhabdomyolysis/SARAH-improving   -CMP daily      11. Tachycardia  -VS Q shift  -Discussed with Dr Phoebe Obregon, pt is chronically tachycardia  -Resuming Metoprolol succ ER 50 mg PO QD: HR trending down   -CTM   12.  Soft loose formed stools  - C-Diff negative   -can give Imodium PRN   -Had forme Another coping skill is I like to pray and go to [employer].

## (undated) NOTE — LETTER
[address], [place]  Authorization for Invasive Procedures  1.  I hereby authorize Kelsie Méndez , my physician and whomever may be designated as the doctor's assistant, to perform the following operation and/or procedure:  Lumbar performed for the purposes of advancing medicine, science, and/or education, provided my identity is not revealed. If the procedure has been videotaped, the physician/surgeon will obtain the original videotape.  The hospital will not be responsible for stor My signature below affirms that prior to the time of the procedure, I have explained to the patient and/or her legal representative, the risks and benefits involved in the proposed treatment and any reasonable alternative to the proposed treatment.  I have

## (undated) NOTE — MR AVS SNAPSHOT
Layton Velásquez [address] [employer]  [phone]               Thank you for choosing us for your health care visit with Lexi Rodriguez PT.   We are glad to serve you and happy to provide you with t 8.  Heel raises:  Stand tall, holding onto counter or walker. Rise up on your toes, then back down slowly. Do 10 times. Try keep your knees unlocked.          Your Appointments     Mar 03, 2017 10:30 AM   [place] [facility] Visit By Therapist with information, go to [email or web address] and click on the Sign Up Now link in the [employer] box. Enter your [employer] Activation Code exactly as it appears below along with your Zip Code and Date of Birth to complete the sign-up process.  If you do

## (undated) NOTE — MR AVS SNAPSHOT
Layton Velásquez [address]  [facility] [place] [address]  [phone]               Thank you for choosing us for your health care visit with Melani Dewey PT.   We are glad to serve you and happy to provide you wit Mar 09, 2017  1:20 PM   Established Patient with MD Elinor Shipman. [address] ([facility])    [address] [id number] [place] [id number]            Mar 13, 2017 10:15 AM   [place] [facility] Visit By Therapist with Belgica Ramirez

## (undated) NOTE — MR AVS SNAPSHOT
Layton Velásquez [address]  [facility] [address]  [phone]               Thank you for choosing us for your health care visit with Kayleen Juárez PT.   We are glad to serve you and happy to provide you wit Mar 01, 2017 11:15 AM   [place] [facility] Visit By Therapist with Lily Epstein, [address] [facility] ([address])    [address]   [phone]           Please arrive at your

## (undated) NOTE — IP AVS SNAPSHOT
[address]  [address] [facility] [phone]                Discharge Summary   5/17/2017    [place]           Admission Information        Provider Department    5/17/2017 Charley Rios MD [facility] 5sw/Se Commonly known as:  Kalani Spencer   What changed:    - how much to take  - how to take this  - when to take this  - reasons to take this  - additional instructions   Next dose due:   Today 05/23/2017        TAKE 1 TABLET(8 MG) BY MOUTH THREE TIMES DAILY    Guillermo Xavier, Last time this was given:  10 mg on 5/19/2017 12:34 PM   Commonly known as:  VALIUM   Next dose due: Today 5/23/2017        Take 1 tablet (10 mg total) by mouth 4 (four) times daily.     Melida Barakat                                 DULoxetine HCl 60 MG C Commonly known as:  ZYPREXA   Next dose due:   Tonight 5/23/2017        TAKE 1 TABLET BY MOUTH NIGHTLY    Negrita Buck                           Pantoprazole Sodium 40 MG Tbec   Last time this was given:  40 mg on 5/23/2017  6:33 AM   Commonly known as: TD 3/6/2011      Recent Hematology Lab Results  (Last 3 results in the past 90 days)    WBC RBC Hemoglobin Hematocrit MCV MCH MCHC RDW Platelet MPV    (12/81/57)  9.3 (05/20/17)  2.97 (L) (05/20/17)  10.5 (L) (05/20/17)  31.8 (L) (05/20/17)  106.9 (H) -- - If you have concerns related to behavioral health issues or thoughts of harming yourself, contact [address] [place] [address] at [phone].     - If you don’t have insurance, Denise Lin has partnered with Patient Ostrovok Matthew call your provider or healthcare team if you have any questions regarding your medications while at home.          Blood Pressure and Cardiac Medications     Metoprolol Succinate ER 50 MG Oral Tablet 24 Hr         Use: Treat abnormal blood pressure (high or Powder, Breath Activated       Use:  Treatment of asthma, COPD/emphysema, cough, allergies   Most common side effects: Headache, nasal irritation, palpitations, increased heart rate, increased blood pressure, allergic reaction, yeast infection of the mouth

## (undated) NOTE — MR AVS SNAPSHOT
L[address]ton Velásquez [address]  [facility] [address]  [phone]               Thank you for choosing us for your health care visit with Romero Grimaldo PT.   We are glad to serve you and happy to provide you wit Mar 31, 2017  1:45 PM   [place] [facility] Visit By Therapist with Liban Costa, [address] [facility] ([facility])    [email or web address]   [phone]           Please arrive at y

## (undated) NOTE — MR AVS SNAPSHOT
L[address]ton Vel[address]quez [address]  [phone]               Thank you for choosing us for your health care visit with Galdino Anand, PT.   We are glad to serve you and happy to provide you with t Jun 01, 2017  3:00 PM   Established Patient with MD Elinor Bryant. [address] ([facility])    [address] [id number] [facility] [phone]     Sign up for [employer], your secure online medical record.   M[employer]esteve tien

## (undated) NOTE — MR AVS SNAPSHOT
L[address]ton Velásquez [address] 50 [employer]  [phone]               Thank you for choosing us for your health care visit with Daniel Garrido, PT.   We are glad to serve you and happy to provide you with t May 17, 2017 12:45 PM   [place] [facility] Visit By Therapist with Kitty Root, [address] [facility] ([address])    [address]   [phone]           Please arrive at your

## (undated) NOTE — MR AVS SNAPSHOT
L[address]ton Velásquez [address] [employer]  [phone]               Thank you for choosing us for your health care visit with Mohini Larsen PT.   We are glad to serve you and happy to provide you with t [address]   [phone]           Please arrive at your scheduled appointment time. Wear comfortable, loose fitting clothing.             Mar 20, 2017 10:15 AM   [place] [facility] Visit By Therapist with Carito Townsend

## (undated) NOTE — MR AVS SNAPSHOT
[address] Vel[address]  [place] [address] [facility] [address]  [phone]  [phone]               Thank you for choosing us for your health care visit with Max Patel PT.   We are glad to serve you and happy to provide you wit Mar 28, 2017 12:00 PM   [place] [facility] Visit By Therapist with Dee Dee Hilliard, [address] [facility] ([address])    [address]   [phone]           Please arrive at y

## (undated) NOTE — MR AVS SNAPSHOT
L[address]ton Velásquez [address]  [phone]               Thank you for choosing us for your health care visit with Kate Crystal PT.   We are glad to serve you and happy to provide you with t 7.  Same position, do a small squat, sticking your butt out, then slowly return to standing tall. Do 5 times. 8.  Heel raises:  Stand tall, holding onto counter or walker. Rise up on your toes, then back down slowly. Do 10 times.   Try keep your knee [employer] will allow you to access patient instructions from your recent visit,  view other health information, and more. To sign up or find more information, go to [email or web address] and click on the Sign Up Now link in the [employer] box.      Enter

## (undated) NOTE — MR AVS SNAPSHOT
L[address]ton Velásquez [address] [employer]  [phone]               Thank you for choosing us for your health care visit with Lisa Metz PT.   We are glad to serve you and happy to provide you with t visit,  view other health information, and more. To sign up or find more information, go to [email or web address] and click on the Sign Up Now link in the [employer] box.      Enter your Frock [employer] Activation Code exactly as it appears below along with yo

## (undated) NOTE — MR AVS SNAPSHOT
L[place]ton Vel[address]quez [address]  [facility] [address]  [phone]  [phone]               Thank you for choosing us for your health care visit with Zehra Coe PT.   We are glad to serve you and happy to provide you wit Apr 04, 2017 12:20 PM   Established Patient with MD Kirk Read [facility] ([facility])    [address]  [place] [id number]            Apr 04, 2017 12:45 PM   [place] [facility] Visit By Therapist with Alvarez Irwin

## (undated) NOTE — LETTER
[address] [facility], [address] [place] [address], [address]  [address] [place] [address] [phone]        Dear Milagros Villareal MD,      I had the pleasure of seeing your patient, Snow Cameron on 7/20/2017.      Below please find a summa Current Outpatient Prescriptions:  Flurazepam HCl 30 MG Oral Cap Take 2 capsules (60 mg total) by mouth nightly as needed for Sleep.  Disp: 60 capsule Rfl: 1   Pantoprazole Sodium 40 MG Oral Tab EC Take 1 tablet (40 mg total) by mouth every morning before b taking differently: Take 1 g by mouth 4 (four) times daily before meals and nightly. TAKE 1 TABLET BY MOUTH tid with meals ) Disp: 90 tablet Rfl: 3   Metoprolol Succinate ER 50 MG Oral Tablet 24 Hr Take 1 tablet (50 mg total) by mouth once daily.  Disp: 30 Past Surgical History:  No date: APPENDECTOMY  No date: CHOLECYSTECTOMY  No date: OTHER SURGICAL HISTORY      Comment: Partial gastrectomy for peptic ulcer disease  No date: OTHER SURGICAL HISTORY      Comment: Total gastrectomy for gastroparesis  No date: CARDIOVASCULAR: denies chest pain or RITTER; no palpitations   GI: Bowel obstruction. GENITAL/: no dysuria, urgency or frequency; no nocturia  MUSCULOSKELETAL: Weakness. Low back pain.   PSYCHE:no depression or anxiety  NEURO: As in HPI    EXAM:     Vitals puncture, to screen for multiple sclerosis. In the meantime, she will continue the same medications. She has a history of migraine headaches and was having more frequent headaches in May.   Topamax was helpful, but it has been discontinued because of si

## (undated) NOTE — MR AVS SNAPSHOT
L[place]ton Velásquez [address] [employer]  [phone]               Thank you for choosing us for your health care visit with Juan Alvarado PT.   We are glad to serve you and happy to provide you with t 7.  Same position, do a small squat, sticking your butt out, then slowly return to standing tall. Do 5 times.         Your Appointments     Feb 27, 2017  1:00 PM   [place] [facility] Visit By Therapist with Aracelis Zheng, [address] [place] [address] R information, go to [email or web address] and click on the Sign Up Now link in the [employer] box. Enter your Power[employer] Activation Code exactly as it appears below along with your Zip Code and Date of Birth to complete the sign-up process.  If you do

## (undated) NOTE — MR AVS SNAPSHOT
Layton Velásquez [address] [employer]  [phone]               Thank you for choosing us for your health care visit with Ritesh Roa PT.   We are glad to serve you and happy to provide you with t 7.  Same position, do a small squat, sticking your butt out, then slowly return to standing tall. Do 5 times. 8.  Heel raises:  Stand tall, holding onto counter or walker. Rise up on your toes, then back down slowly. Do 10 times.   Try keep your knee Please arrive at your scheduled appointment time. Wear comfortable, loose fitting clothing. [employer]     Sign up for F[employer] Or Diet, your secure online medical record.   [employer] will allow you to access patient instructions from your recent visit,  v

## (undated) NOTE — IP AVS SNAPSHOT
[address]  [address] [facility] [phone]                Discharge Summary   2/2/2017    [place]           Admission Information        Provider Department    2/2/2017 Merlin Vigil MD [facility] 5sw/Se bisacodyl 10 MG Supp   Commonly known as:  DULCOLAX   Next dose due: Tomorrow morning 2/8/17        Place 1 suppository (10 mg total) rectally daily.     Negrita Buck                           BusPIRone HCl 15 MG Tabs   Last claribel LORazepam 2 MG Tabs   Last time this was given:  2 mg on 2/7/2017  1:11 PM   Commonly known as:  ATIVAN   Next dose due:  As needed for anxiety         Take 1 tablet (2 mg total) by mouth 2 (two) times daily as needed for Anxiety.     Reji Burnett, Next dose due:  As needed for migraine         TAKE 1 TABLET BY MOUTH IMMEDIATELY AT ONSET OF MIGRAINE. MAY REPEAT AFTER 2 HOURS.  MAXIMUM DAILY DOSE IS 200MG    Christopher Feldman                           TraZODone HCl 100 MG Tabs   Last time this was given: 7.9      Recent Hematology Lab Results (cont.)  (Last 3 results in the past 90 days)    Neutrophil % Lymphocyte % Monocyte % Eosinophil % Basophil % Prelim Neut Abs Final Neut Abs Lymphocyte Abso Monocyte Absolu Eosinophil Abso Basophil Absolu    (02/02/17 Medicaid plans. To get signed up and covered, please call [phone] and ask to get set up for an insurance coverage that is in-network with Adismocandido Lin. Erik     Sign up for [employer], your secure online medical record.   [employer] wi What to report to your healthcare team:  Dizziness, nausea, chest pain, weakness, numbness           Narcotic Medications     HYDROcodone-acetaminophen (NORCO)  MG Oral Tab    morphINE Sulfate ER 30 MG Oral Tab CR       Use:  Treat pain   Most common Prochlorperazine Maleate (COMPAZINE) 10 mg tablet    TraZODone HCl 100 MG Oral Tab    DULoxetine HCl (CYMBALTA) 60 MG Oral Cap DR Particles    OLANZapine 2.5 MG Oral Tab       Use: Treat conditions such as depression and thought disorders   Most common si